# Patient Record
Sex: FEMALE | Race: WHITE | NOT HISPANIC OR LATINO | Employment: FULL TIME | URBAN - METROPOLITAN AREA
[De-identification: names, ages, dates, MRNs, and addresses within clinical notes are randomized per-mention and may not be internally consistent; named-entity substitution may affect disease eponyms.]

---

## 2020-08-21 ENCOUNTER — TRANSCRIBE ORDERS (OUTPATIENT)
Dept: ADMINISTRATIVE | Facility: HOSPITAL | Age: 55
End: 2020-08-21

## 2020-08-21 DIAGNOSIS — Z12.31 SCREENING MAMMOGRAM FOR HIGH-RISK PATIENT: Primary | ICD-10-CM

## 2020-08-26 ENCOUNTER — HOSPITAL ENCOUNTER (OUTPATIENT)
Dept: MAMMOGRAPHY | Facility: HOSPITAL | Age: 55
Discharge: HOME | End: 2020-08-26
Attending: SPECIALIST
Payer: COMMERCIAL

## 2020-08-26 VITALS — HEIGHT: 62 IN | WEIGHT: 132 LBS | BODY MASS INDEX: 24.29 KG/M2

## 2020-08-26 DIAGNOSIS — Z12.31 SCREENING MAMMOGRAM FOR HIGH-RISK PATIENT: ICD-10-CM

## 2020-08-26 PROCEDURE — 77067 SCR MAMMO BI INCL CAD: CPT

## 2020-08-26 PROCEDURE — 77063 BREAST TOMOSYNTHESIS BI: CPT

## 2020-10-15 ENCOUNTER — ANESTHESIA EVENT (OUTPATIENT)
Dept: GASTROENTEROLOGY | Facility: HOSPITAL | Age: 55
End: 2020-10-15

## 2020-10-15 PROBLEM — F32.A DEPRESSION: Status: ACTIVE | Noted: 2020-10-15

## 2020-10-15 PROBLEM — K21.9 GASTROESOPHAGEAL REFLUX DISEASE: Status: ACTIVE | Noted: 2020-10-15

## 2020-10-16 ENCOUNTER — HOSPITAL ENCOUNTER (OUTPATIENT)
Dept: GASTROENTEROLOGY | Facility: HOSPITAL | Age: 55
Setting detail: OUTPATIENT SURGERY
Discharge: HOME | End: 2020-10-16
Attending: COLON & RECTAL SURGERY | Admitting: COLON & RECTAL SURGERY
Payer: COMMERCIAL

## 2020-10-16 ENCOUNTER — ANESTHESIA (OUTPATIENT)
Dept: GASTROENTEROLOGY | Facility: HOSPITAL | Age: 55
End: 2020-10-16

## 2020-10-16 VITALS
SYSTOLIC BLOOD PRESSURE: 140 MMHG | DIASTOLIC BLOOD PRESSURE: 81 MMHG | RESPIRATION RATE: 16 BRPM | WEIGHT: 132 LBS | HEART RATE: 57 BPM | TEMPERATURE: 96.9 F | BODY MASS INDEX: 24.29 KG/M2 | HEIGHT: 62 IN | OXYGEN SATURATION: 100 %

## 2020-10-16 VITALS — HEART RATE: 56 BPM

## 2020-10-16 DIAGNOSIS — Z12.11 ENCOUNTER FOR SCREENING FOR MALIGNANT NEOPLASM OF COLON: ICD-10-CM

## 2020-10-16 DIAGNOSIS — Z86.010 PERSONAL HISTORY OF COLONIC POLYPS: ICD-10-CM

## 2020-10-16 PROBLEM — F17.200 SMOKING: Status: ACTIVE | Noted: 2020-10-16

## 2020-10-16 RX ORDER — SODIUM CHLORIDE 9 MG/ML
50 INJECTION, SOLUTION INTRAVENOUS CONTINUOUS
Status: DISCONTINUED | OUTPATIENT
Start: 2020-10-16 | End: 2020-10-20 | Stop reason: HOSPADM

## 2020-10-16 RX ORDER — PROPOFOL 10 MG/ML
INJECTION, EMULSION INTRAVENOUS AS NEEDED
Status: DISCONTINUED | OUTPATIENT
Start: 2020-10-16 | End: 2020-10-16

## 2020-10-16 RX ADMIN — SODIUM CHLORIDE: 0.9 INJECTION, SOLUTION INTRAVENOUS at 10:28

## 2020-10-16 RX ADMIN — PROPOFOL 50 MG: 10 INJECTION, EMULSION INTRAVENOUS at 11:06

## 2020-10-16 RX ADMIN — PROPOFOL 50 MG: 10 INJECTION, EMULSION INTRAVENOUS at 11:05

## 2020-10-16 RX ADMIN — PROPOFOL 50 MG: 10 INJECTION, EMULSION INTRAVENOUS at 11:11

## 2020-10-16 RX ADMIN — PROPOFOL 50 MG: 10 INJECTION, EMULSION INTRAVENOUS at 11:13

## 2020-10-16 RX ADMIN — PROPOFOL 50 MG: 10 INJECTION, EMULSION INTRAVENOUS at 11:09

## 2020-10-16 RX ADMIN — PROPOFOL 50 MG: 10 INJECTION, EMULSION INTRAVENOUS at 11:15

## 2021-04-13 ENCOUNTER — IMMUNIZATIONS (OUTPATIENT)
Dept: FAMILY MEDICINE CLINIC | Facility: HOSPITAL | Age: 56
End: 2021-04-13

## 2021-04-13 DIAGNOSIS — Z23 ENCOUNTER FOR IMMUNIZATION: Primary | ICD-10-CM

## 2021-04-13 PROCEDURE — 91301 SARS-COV-2 / COVID-19 MRNA VACCINE (MODERNA) 100 MCG: CPT

## 2021-04-13 PROCEDURE — 0011A SARS-COV-2 / COVID-19 MRNA VACCINE (MODERNA) 100 MCG: CPT

## 2021-05-13 ENCOUNTER — IMMUNIZATIONS (OUTPATIENT)
Dept: FAMILY MEDICINE CLINIC | Facility: HOSPITAL | Age: 56
End: 2021-05-13

## 2021-05-13 DIAGNOSIS — Z23 ENCOUNTER FOR IMMUNIZATION: Primary | ICD-10-CM

## 2021-05-13 PROCEDURE — 0012A SARS-COV-2 / COVID-19 MRNA VACCINE (MODERNA) 100 MCG: CPT

## 2021-05-13 PROCEDURE — 91301 SARS-COV-2 / COVID-19 MRNA VACCINE (MODERNA) 100 MCG: CPT

## 2022-01-09 ENCOUNTER — HOSPITAL ENCOUNTER (EMERGENCY)
Facility: HOSPITAL | Age: 57
Discharge: HOME | End: 2022-01-09
Attending: EMERGENCY MEDICINE
Payer: COMMERCIAL

## 2022-01-09 VITALS
DIASTOLIC BLOOD PRESSURE: 61 MMHG | RESPIRATION RATE: 16 BRPM | HEART RATE: 118 BPM | HEIGHT: 62 IN | TEMPERATURE: 97.5 F | SYSTOLIC BLOOD PRESSURE: 112 MMHG | OXYGEN SATURATION: 97 %
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
## 2022-01-09 DIAGNOSIS — F10.920 ALCOHOLIC INTOXICATION WITHOUT COMPLICATION (CMS/HCC): ICD-10-CM

## 2022-01-09 PROCEDURE — 99283 EMERGENCY DEPT VISIT LOW MDM: CPT | Mod: 25

## 2022-01-09 PROCEDURE — 63700000 HC SELF-ADMINISTRABLE DRUG: Performed by: NURSE PRACTITIONER

## 2022-01-09 RX ORDER — DOXEPIN HYDROCHLORIDE 50 MG/1
CAPSULE ORAL NIGHTLY PRN
COMMUNITY
Start: 2021-11-30

## 2022-01-09 RX ORDER — ONDANSETRON 4 MG/1
4 TABLET, ORALLY DISINTEGRATING ORAL ONCE
Status: COMPLETED | OUTPATIENT
Start: 2022-01-09 | End: 2022-01-09

## 2022-01-09 RX ADMIN — ONDANSETRON 4 MG: 4 TABLET, ORALLY DISINTEGRATING ORAL at 22:05

## 2022-01-09 ASSESSMENT — ENCOUNTER SYMPTOMS
VOMITING: 0
NAUSEA: 0
BRUISES/BLEEDS EASILY: 0
WEAKNESS: 0
WOUND: 0
HEADACHES: 0
BACK PAIN: 0
SHORTNESS OF BREATH: 0
ABDOMINAL PAIN: 0
NECK PAIN: 0

## 2022-01-10 NOTE — ED PROVIDER NOTES
Emergency Medicine Note  HPI   HISTORY OF PRESENT ILLNESS     56-year-old female with past medical history alcoholism presents to the emergency department from [facility] with reported fall.  Patient states that she was drinking all day today, she cannot quantify the amount of alcohol that she had and reports her last drink was approximately 1 hour ago.  She states that she was walking into [facility] to check her self and for alcohol rehabilitation when she slipped and fell.  She states that she fell outside the building.  Patient denies LOC or resulting injury.  She offers no complaints in the emergency department and states that she does not know why she is here.  She denies headache, neck pain, chest pain, difficulty breathing, abdominal pain, nausea or vomiting, injury to extremities.  Patient states that she is been ambulatory since the fall.  Denies anticoagulation use.            Patient History   PAST HISTORY     Reviewed from Nursing Triage:       No past medical history on file.    Past Surgical History:   Procedure Laterality Date   • ORTHOPEDIC SURGERY         No family history on file.    Social History     Tobacco Use   • Smoking status: Current Every Day Smoker   • Smokeless tobacco: Never Used   Substance Use Topics   • Alcohol use: Yes     Comment: ETOH   • Drug use: Never         Review of Systems   REVIEW OF SYSTEMS     Review of Systems   Respiratory: Negative for shortness of breath.    Cardiovascular: Negative for chest pain.   Gastrointestinal: Negative for abdominal pain, nausea and vomiting.   Musculoskeletal: Negative for back pain and neck pain.   Skin: Negative for wound.   Neurological: Negative for weakness and headaches.   Hematological: Does not bruise/bleed easily.         VITALS     ED Vitals    Date/Time Temp Pulse Resp BP SpO2 [facility]   01/09/22 2136 36.4 °C (97.5 °F) 118 16 112/61 97 % AG        Pulse Ox %: 97 % (01/09/22 2146)  Pulse Ox Interpretation: Normal (01/09/22 2146)           Physical  Exam   PHYSICAL EXAM     Physical Exam  Vitals and nursing note reviewed.   Constitutional:       Appearance: Normal appearance.      Comments: Patient appears intoxicated   HENT:      Head: Atraumatic.      Jaw: There is normal jaw occlusion. No trismus, tenderness, swelling, pain on movement or malocclusion.      Nose: Nose normal.      Mouth/Throat:      Dentition: Normal dentition.        Comments: Ecchymosis to lower lip; no dental injury, fracture, avulsion or subluxation of teeth  FROM of jaw without pain  Eyes:      Extraocular Movements: Extraocular movements intact.   Cardiovascular:      Rate and Rhythm: Normal rate and regular rhythm.   Pulmonary:      Effort: Pulmonary effort is normal. No respiratory distress.      Breath sounds: Normal breath sounds.   Abdominal:      General: Abdomen is flat. Bowel sounds are normal. There is no distension.      Palpations: Abdomen is soft.      Tenderness: There is no abdominal tenderness. There is no guarding.   Musculoskeletal:         General: No deformity or signs of injury. Normal range of motion.      Cervical back: Normal range of motion. No pain with movement, spinous process tenderness or muscular tenderness. Normal range of motion.      Thoracic back: No tenderness or bony tenderness. Normal range of motion.      Lumbar back: No tenderness or bony tenderness. Normal range of motion.      Comments: Moves all extremities equally  Pelvis stable   Skin:     General: Skin is warm and dry.   Neurological:      General: No focal deficit present.      Mental Status: She is alert and oriented to person, place, and time.   Psychiatric:         Mood and Affect: Mood normal.         Behavior: Behavior normal.           PROCEDURES     Procedures     DATA     Results     None          Imaging Results    None         No orders to display       Scoring tools                                 ED Course & MDM   MDM / ED COURSE and CLINICAL IMPRESSIONS     MDM  Number of  Diagnoses or Management Options     Amount and/or Complexity of Data Reviewed  Discuss the patient with other providers: yes    Patient Progress  Patient progress: stable      ED Course as of 01/09/22 2205   Sun Jan 09, 2022 2145 Impression: Patient was walking into [facility] when she slipped and fell; she denies injury and offers no complaints in the ED; states she does not know why she is here  Patient ambulatory in ED, no outward signs of trauma, moves all extremities equally  Patient intoxicated, states that she was drinking PTA; she was checking into [facility] for alcohol rehabilitation and would like to go back to [facility]  We spoke to [facility] who will arrange for transportation back to facility [EP]      ED Course User Index  [EP] Pallante, Elizabeth P, CRNP         Clinical Impressions as of 01/09/22 2205   Fall, initial encounter   Alcoholic intoxication without complication (CMS/[facility])            Pallante, Elizabeth P, CRNP  01/09/22 2154       Pallante, Elizabeth P, CRNP  01/09/22 2205

## 2022-01-10 NOTE — ED ATTESTATION NOTE
1/9/20229:44 PM  I have personally seen the patient.  I reviewed and agree with the PA/NP/Resident's assessment and plan of care.         Steffen Marie, DO  01/09/22 2149

## 2022-01-10 NOTE — DISCHARGE INSTRUCTIONS
You were seen in the emergency department after a fall.  On exam you have no resulting signs of injury or trauma from the fall and have been ambulating in the emergency department without difficulty.    Please go directly to [facility] to complete alcohol rehabilitation.    [facility] may give you Motrin 600 mg every 6 hours as needed for pain.    If you develop severe headache, severe chest pain or difficulty breathing, or any other new or concerning symptoms return to the emergency department.

## 2022-10-07 ENCOUNTER — HOSPITAL ENCOUNTER (EMERGENCY)
Facility: HOSPITAL | Age: 57
Discharge: HOME | End: 2022-10-07
Attending: EMERGENCY MEDICINE
Payer: COMMERCIAL

## 2022-10-07 VITALS
BODY MASS INDEX: 22.08 KG/M2 | RESPIRATION RATE: 16 BRPM | OXYGEN SATURATION: 98 % | TEMPERATURE: 97 F | DIASTOLIC BLOOD PRESSURE: 74 MMHG | HEART RATE: 99 BPM | HEIGHT: 62 IN | WEIGHT: 120 LBS | SYSTOLIC BLOOD PRESSURE: 124 MMHG

## 2022-10-07 DIAGNOSIS — R79.89 ELEVATED LFTS: ICD-10-CM

## 2022-10-07 DIAGNOSIS — F10.920 ALCOHOLIC INTOXICATION WITHOUT COMPLICATION (CMS/HCC): Primary | ICD-10-CM

## 2022-10-07 LAB
ALBUMIN SERPL-MCNC: 4.4 G/DL (ref 3.4–5)
ALP SERPL-CCNC: 75 IU/L (ref 35–126)
ALT SERPL-CCNC: 94 IU/L (ref 11–54)
AMPHET UR QL SCN: NOT DETECTED
ANION GAP SERPL CALC-SCNC: 17 MEQ/L (ref 3–15)
APAP SERPL-MCNC: <10 UG/ML (ref 10–30)
AST SERPL-CCNC: 156 IU/L (ref 15–41)
BARBITURATES UR QL SCN: NOT DETECTED
BASOPHILS # BLD: 0.06 K/UL (ref 0.01–0.1)
BASOPHILS NFR BLD: 0.8 %
BENZODIAZ UR QL SCN: NOT DETECTED
BILIRUB SERPL-MCNC: 1.1 MG/DL (ref 0.3–1.2)
BUN SERPL-MCNC: 10 MG/DL (ref 8–20)
CALCIUM SERPL-MCNC: 9.1 MG/DL (ref 8.9–10.3)
CANNABINOIDS UR QL SCN: NOT DETECTED
CHLORIDE SERPL-SCNC: 98 MEQ/L (ref 98–109)
CO2 SERPL-SCNC: 23 MEQ/L (ref 22–32)
COCAINE UR QL SCN: NOT DETECTED
CREAT SERPL-MCNC: 0.7 MG/DL (ref 0.6–1.1)
DIFFERENTIAL METHOD BLD: ABNORMAL
EOSINOPHIL # BLD: 0.02 K/UL (ref 0.04–0.36)
EOSINOPHIL NFR BLD: 0.3 %
ERYTHROCYTE [DISTWIDTH] IN BLOOD BY AUTOMATED COUNT: 13.5 % (ref 11.7–14.4)
ETHANOL SERPL-MCNC: 304 MG/DL
GFR SERPL CREATININE-BSD FRML MDRD: >60 ML/MIN/1.73M*2
GLUCOSE SERPL-MCNC: 102 MG/DL (ref 70–99)
HCT VFR BLDCO AUTO: 40.1 % (ref 35–45)
HGB BLD-MCNC: 13.9 G/DL (ref 11.8–15.7)
IMM GRANULOCYTES # BLD AUTO: 0.02 K/UL (ref 0–0.08)
IMM GRANULOCYTES NFR BLD AUTO: 0.3 %
LYMPHOCYTES # BLD: 1.11 K/UL (ref 1.2–3.5)
LYMPHOCYTES NFR BLD: 14.3 %
MCH RBC QN AUTO: 31.8 PG (ref 28–33.2)
MCHC RBC AUTO-ENTMCNC: 34.7 G/DL (ref 32.2–35.5)
MCV RBC AUTO: 91.8 FL (ref 83–98)
MONOCYTES # BLD: 0.39 K/UL (ref 0.28–0.8)
MONOCYTES NFR BLD: 5 %
NEUTROPHILS # BLD: 6.14 K/UL (ref 1.7–7)
NEUTS SEG NFR BLD: 79.3 %
NRBC BLD-RTO: 0 %
OPIATES UR QL SCN: NOT DETECTED
PCP UR QL SCN: NOT DETECTED
PDW BLD AUTO: 10.5 FL (ref 9.4–12.3)
PLATELET # BLD AUTO: 117 K/UL (ref 150–369)
POTASSIUM SERPL-SCNC: 3.5 MEQ/L (ref 3.6–5.1)
PROT SERPL-MCNC: 7.3 G/DL (ref 6–8.2)
RBC # BLD AUTO: 4.37 M/UL (ref 3.93–5.22)
SALICYLATES SERPL-MCNC: <4 MG/DL
SODIUM SERPL-SCNC: 138 MEQ/L (ref 136–144)
WBC # BLD AUTO: 7.74 K/UL (ref 3.8–10.5)

## 2022-10-07 PROCEDURE — G0480 DRUG TEST DEF 1-7 CLASSES: HCPCS

## 2022-10-07 PROCEDURE — 85025 COMPLETE CBC W/AUTO DIFF WBC: CPT

## 2022-10-07 PROCEDURE — 36415 COLL VENOUS BLD VENIPUNCTURE: CPT

## 2022-10-07 PROCEDURE — 80053 COMPREHEN METABOLIC PANEL: CPT | Performed by: EMERGENCY MEDICINE

## 2022-10-07 PROCEDURE — G0480 DRUG TEST DEF 1-7 CLASSES: HCPCS | Performed by: EMERGENCY MEDICINE

## 2022-10-07 PROCEDURE — 80307 DRUG TEST PRSMV CHEM ANLYZR: CPT | Performed by: PHYSICIAN ASSISTANT

## 2022-10-07 PROCEDURE — 85025 COMPLETE CBC W/AUTO DIFF WBC: CPT | Performed by: EMERGENCY MEDICINE

## 2022-10-07 PROCEDURE — 99283 EMERGENCY DEPT VISIT LOW MDM: CPT

## 2022-10-07 ASSESSMENT — ENCOUNTER SYMPTOMS
DIZZINESS: 0
WOUND: 0
DIARRHEA: 0
CONFUSION: 0
NERVOUS/ANXIOUS: 1
FEVER: 0
HEADACHES: 0
BACK PAIN: 0
NAUSEA: 1
VOMITING: 0
NUMBNESS: 0
SHORTNESS OF BREATH: 0
NECK PAIN: 0
ABDOMINAL PAIN: 0
WEAKNESS: 0

## 2022-10-07 NOTE — ED ATTESTATION NOTE
Procedures  Physical Exam  Review of Systems    10/7/84857:06 PM  I have personally seen and examined the patient.  I reviewed and agree with the PA/NP/Resident's assessment and plan of care.    My examination, assessment, and plan of care of Meghna Morales is as follows:  The patient presents requesting detox.  Patient had a fall yesterday.  Exam: Alert and oriented  GCS 15  No external trauma appreciated  Impression/Plan: Patient has an alcohol level over 300.  We will work on placement    Vital Signs Review: Vital signs have been reviewed. The oxygen saturation is  SpO2: 97 % which is normal.    I was physically present for the key/critical portions of the following procedures: None    This document was created using dragon dictation software.  There might be some typographical errors due to this technology.    We are in a pandemic with increased volumes and decreased capacity.      Axel Jay MD  10/07/22 9233

## 2022-10-07 NOTE — ED PROVIDER NOTES
Emergency Medicine Note  HPI   HISTORY OF PRESENT ILLNESS     HPI     58 yo female with h/o alcohol abuse, depression, dyslipidemia presents for medical clearance to start detox at [facility]. Pt states she drinks vodka daily. Last drink 2 days ago. Pt notes recent fall without injury. Denies head injury, headache, neck or back pain, chest pain, abdominal pain. Pt notes withdrawal symptom - anxiety, tremor, nausea. Pt has not been eating very much recently. Denies h/o detox/rehab.      Patient History   PAST HISTORY     Reviewed from Nursing Triage:         History reviewed. No pertinent past medical history.    Past Surgical History:   Procedure Laterality Date    ORTHOPEDIC SURGERY         History reviewed. No pertinent family history.    Social History     Tobacco Use    Smoking status: Current Every Day Smoker    Smokeless tobacco: Never Used   Substance Use Topics    Alcohol use: Yes     Comment: ETOH    Drug use: Never         Review of Systems   REVIEW OF SYSTEMS     Review of Systems   Constitutional: Negative for fever.   HENT: Negative for congestion.    Eyes: Negative for visual disturbance.   Respiratory: Negative for shortness of breath.    Cardiovascular: Negative for chest pain.   Gastrointestinal: Positive for nausea. Negative for abdominal pain, diarrhea and vomiting.   Musculoskeletal: Negative for back pain and neck pain.   Skin: Negative for wound.   Neurological: Negative for dizziness, weakness, numbness and headaches.   Psychiatric/Behavioral: Negative for confusion. The patient is nervous/anxious.          VITALS     ED Vitals    Date/Time Temp Pulse Resp BP SpO2 [facility]   10/07/22 1453 -- 99 16 124/74 98 % KP   10/07/22 1312 36.1 °C (97 °F) 103 20 158/79 97 % PB                       Physical Exam   PHYSICAL EXAM     Physical Exam  Vitals and nursing note reviewed.   Constitutional:       Appearance: She is well-developed.   HENT:      Head: Normocephalic and atraumatic.      Mouth/Throat:       Mouth: Mucous membranes are moist.   Eyes:      Extraocular Movements: Extraocular movements intact.   Cardiovascular:      Rate and Rhythm: Normal rate and regular rhythm.   Pulmonary:      Effort: Pulmonary effort is normal.      Breath sounds: Normal breath sounds.   Musculoskeletal:         General: Normal range of motion.      Cervical back: Neck supple.   Skin:     General: Skin is warm and dry.   Neurological:      General: No focal deficit present.      Mental Status: She is alert and oriented to person, place, and time.      Sensory: No sensory deficit.      Motor: No weakness.      Gait: Gait normal.   Psychiatric:         Mood and Affect: Mood normal.           PROCEDURES     Procedures     DATA     Results     Procedure Component Value Units Date/Time    Urine drug screen (UDS) [[id number]]  (Normal) Collected: 10/07/22 1431    Specimen: Urine, Clean Catch Updated: 10/07/22 1451     PCP Scrn, Ur Not Detected     Comment: Assay Detects: phencyclidine in urine. Lowest detectable concentration is 25 ng/mL of phencyclidine.        Benzodiazepine Ur Qual Not Detected     Comment: Assay Detects: benzodiazepines and metabolites at varying concentrations. Lowest detectable concentration is 200 ng/mL of oxazepam.        Cocaine Screen, Urine Not Detected     Comment: Assay Detects: benzoylecgonine and cocaine in urine. Lowest detectable concentration is 300 ng/mL of benzoylecgonine.        Amphetamine+Methamphetamine Screen, Ur Not Detected     Comment: Assay Detects: d-methamphetamine, d-amphetamine, methlyenedioxyamphetamine (MDA), and methlyenendioxymethamphetamine (MDMA) in urine. Lowest detectable concentration is 1000 ng/mL of d-methamphetamine.  Assay is less sensitive to MDA and MDMA (lowest detectable concentration, 2500 ng/mL) and could produce a false negative result. If MDMA overdose is suspected and the result is negative, a more specific test should be requested.        Cannabinoid Screen, Urine Not  Detected     Comment: Assay Detects: cannabinoid metabolites in urine. Lowest detectable concentration is 50 ng/mL        Opiate Scrn, Ur Not Detected     Comment: Assay Detects: codeine, dihydrocodeine, hydrocodone, hydromorphone, levorphanol, morphine, morphine-3-glucuronide, norcodeine, oxycodone in urine. Lowest detectable concentration is 300 ng/mL of morphine.        Barbiturate Screen, Ur Not Detected     Comment: Assay Detects: alphenal, amobarbital, aprobarbital, barbital, butabarbital, butalbital, butethal, diallybarbital, pentobarbital, secobarbital,talbutal, and thiopental. Lowest detectable concentration is 200 ng/mL of secobarbital.       ED Toxicology Screen - Alcohol, Acetaminophen, Salicylate [[id number]]  (Abnormal) Collected: 10/07/22 1315    Specimen: Blood, Venous Updated: 10/07/22 1400     Salicylate <4.0 mg/dL      Acetaminophen <10.0 ug/mL      Ethanol 304 mg/dL     Comprehensive metabolic panel [[id number]]  (Abnormal) Collected: 10/07/22 1315    Specimen: Blood, Venous Updated: 10/07/22 1400     Sodium 138 mEQ/L      Potassium 3.5 mEQ/L      Comment: Results obtained on plasma. Plasma Potassium values may be up to 0.4 mEQ/L less than serum values. The differences may be greater for patients with high platelet or white cell counts.        Chloride 98 mEQ/L      CO2 23 mEQ/L      BUN 10 mg/dL      Creatinine 0.7 mg/dL      Glucose 102 mg/dL      Calcium 9.1 mg/dL      AST (SGOT) 156 IU/L      ALT (SGPT) 94 IU/L      Alkaline Phosphatase 75 IU/L      Total Protein 7.3 g/dL      Comment: Test performed on plasma which typically contains approximately 0.4 g/dL more protein than serum.        Albumin 4.4 g/dL      Bilirubin, Total 1.1 mg/dL      eGFR >60.0 mL/min/1.73m*2      Anion Gap 17 mEQ/L     CBC and differential [[id number]]  (Abnormal) Collected: 10/07/22 1315    Specimen: Blood, Venous Updated: 10/07/22 1343     WBC 7.74 K/uL      RBC 4.37 M/uL      Hemoglobin 13.9 g/dL      Hematocrit 40.1  %      MCV 91.8 fL      MCH 31.8 pg      MCHC 34.7 g/dL      RDW 13.5 %      Platelets 117 K/uL      Comment: RESULTS CHECKED. SPECIMEN QUALITY CHECKED        MPV 10.5 fL      Differential Type Auto     nRBC 0.0 %      Immature Granulocytes 0.3 %      Neutrophils 79.3 %      Lymphocytes 14.3 %      Monocytes 5.0 %      Eosinophils 0.3 %      Basophils 0.8 %      Immature Granulocytes, Absolute 0.02 K/uL      Neutrophils, Absolute 6.14 K/uL      Lymphocytes, Absolute 1.11 K/uL      Monocytes, Absolute 0.39 K/uL      Eosinophils, Absolute 0.02 K/uL      Basophils, Absolute 0.06 K/uL     RAINBOW RED [[id number]] Collected: 10/07/22 1315    Specimen: Blood, Venous Updated: 10/07/22 1322    RAINBOW GOLD [[id number]] Collected: 10/07/22 1315    Specimen: Blood, Venous Updated: 10/07/22 1322    [place] Draw Panel [[id number]] Collected: 10/07/22 1315    Specimen: Blood, Venous Updated: 10/07/22 1322    Narrative:      The following orders were created for panel order [place] Draw Panel.  Procedure                               Abnormality         Status                     ---------                               -----------         ------                     RAINBOW RED[[id number]]                                      In process                 RAINBOW LT BLUE[[id number]]                                  In process                 RAINBOW GOLD[[id number]]                                     In process                   Please view results for these tests on the individual orders.    RAINBOW LT BLUE [[id number]] Collected: 10/07/22 1315    Specimen: Blood, Venous Updated: 10/07/22 1322          Imaging Results    None         No orders to display       Scoring tools                                  ED Course & MDM   MDM / ED COURSE / CLINICAL IMPRESSION / DISPO     MDM       Clinical Impression      Alcoholic intoxication without complication (CMS/HCC)   Elevated LFTs     Disposition           Verónica Linder PA C  10/07/22  1949

## 2022-10-07 NOTE — DISCHARGE INSTRUCTIONS
Follow-up with [facility] for detox and rehab.  Return to the emergency department if new or worsening symptoms develop.

## 2023-02-25 ENCOUNTER — APPOINTMENT (EMERGENCY)
Dept: RADIOLOGY | Facility: HOSPITAL | Age: 58
End: 2023-02-25

## 2023-02-25 ENCOUNTER — HOSPITAL ENCOUNTER (INPATIENT)
Facility: HOSPITAL | Age: 58
LOS: 1 days | Discharge: HOME | End: 2023-02-26
Attending: SURGERY | Admitting: SURGERY

## 2023-02-25 DIAGNOSIS — F10.920 ALCOHOLIC INTOXICATION WITHOUT COMPLICATION (HCC): Primary | ICD-10-CM

## 2023-02-25 DIAGNOSIS — S09.90XA CLOSED HEAD INJURY, INITIAL ENCOUNTER: ICD-10-CM

## 2023-02-25 DIAGNOSIS — F10.929 ACUTE ALCOHOL INTOXICATION (HCC): ICD-10-CM

## 2023-02-25 DIAGNOSIS — R41.0 DELIRIUM: ICD-10-CM

## 2023-02-25 DIAGNOSIS — J96.01 ACUTE HYPOXEMIC RESPIRATORY FAILURE (HCC): ICD-10-CM

## 2023-02-25 PROBLEM — S01.112A LACERATION OF LEFT EYEBROW: Status: ACTIVE | Noted: 2023-02-25

## 2023-02-25 PROBLEM — N26.1 KIDNEY ATROPHY: Status: ACTIVE | Noted: 2023-02-25

## 2023-02-25 PROBLEM — E87.20 LACTIC ACID ACIDOSIS: Status: ACTIVE | Noted: 2023-02-25
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
## 2023-02-25 LAB
ALBUMIN SERPL BCP-MCNC: 3.8 G/DL (ref 3.5–5)
ALBUMIN SERPL BCP-MCNC: 3.8 G/DL (ref 3.5–5)
ALP SERPL-CCNC: 88 U/L (ref 46–116)
ALP SERPL-CCNC: 88 U/L (ref 46–116)
ALT SERPL W P-5'-P-CCNC: 22 U/L (ref 12–78)
ALT SERPL W P-5'-P-CCNC: 22 U/L (ref 12–78)
ANION GAP SERPL CALCULATED.3IONS-SCNC: 9 MMOL/L (ref 4–13)
ANION GAP SERPL CALCULATED.3IONS-SCNC: 9 MMOL/L (ref 4–13)
AST SERPL W P-5'-P-CCNC: 26 U/L (ref 5–45)
AST SERPL W P-5'-P-CCNC: 26 U/L (ref 5–45)
BACTERIA UR QL AUTO: ABNORMAL /HPF
BACTERIA UR QL AUTO: ABNORMAL /HPF
BASE EXCESS BLDA CALC-SCNC: 1 MMOL/L (ref -2–3)
BASE EXCESS BLDA CALC-SCNC: 1 MMOL/L (ref -2–3)
BASOPHILS # BLD AUTO: 0.1 THOUSANDS/ÂΜL (ref 0–0.1)
BASOPHILS # BLD AUTO: 0.1 THOUSANDS/ÂΜL (ref 0–0.1)
BASOPHILS NFR BLD AUTO: 1 % (ref 0–1)
BASOPHILS NFR BLD AUTO: 1 % (ref 0–1)
BILIRUB SERPL-MCNC: 0.16 MG/DL (ref 0.2–1)
BILIRUB SERPL-MCNC: 0.16 MG/DL (ref 0.2–1)
BILIRUB UR QL STRIP: NEGATIVE
BILIRUB UR QL STRIP: NEGATIVE
BUN SERPL-MCNC: 12 MG/DL (ref 5–25)
BUN SERPL-MCNC: 12 MG/DL (ref 5–25)
CA-I BLD-SCNC: 1.08 MMOL/L (ref 1.12–1.32)
CA-I BLD-SCNC: 1.08 MMOL/L (ref 1.12–1.32)
CA-I BLD-SCNC: 1.14 MMOL/L (ref 1.12–1.32)
CA-I BLD-SCNC: 1.14 MMOL/L (ref 1.12–1.32)
CALCIUM SERPL-MCNC: 9 MG/DL (ref 8.3–10.1)
CALCIUM SERPL-MCNC: 9 MG/DL (ref 8.3–10.1)
CHLORIDE SERPL-SCNC: 109 MMOL/L (ref 96–108)
CHLORIDE SERPL-SCNC: 109 MMOL/L (ref 96–108)
CLARITY UR: CLEAR
CLARITY UR: CLEAR
CO2 SERPL-SCNC: 25 MMOL/L (ref 21–32)
CO2 SERPL-SCNC: 25 MMOL/L (ref 21–32)
COLOR UR: COLORLESS
COLOR UR: COLORLESS
CREAT SERPL-MCNC: 0.85 MG/DL (ref 0.6–1.3)
CREAT SERPL-MCNC: 0.85 MG/DL (ref 0.6–1.3)
EOSINOPHIL # BLD AUTO: 0.1 THOUSAND/ÂΜL (ref 0–0.61)
EOSINOPHIL # BLD AUTO: 0.1 THOUSAND/ÂΜL (ref 0–0.61)
EOSINOPHIL NFR BLD AUTO: 1 % (ref 0–6)
EOSINOPHIL NFR BLD AUTO: 1 % (ref 0–6)
ERYTHROCYTE [DISTWIDTH] IN BLOOD BY AUTOMATED COUNT: 13.9 % (ref 11.6–15.1)
ERYTHROCYTE [DISTWIDTH] IN BLOOD BY AUTOMATED COUNT: 13.9 % (ref 11.6–15.1)
GFR SERPL CREATININE-BSD FRML MDRD: 74 ML/MIN/1.73SQ M
GFR SERPL CREATININE-BSD FRML MDRD: 74 ML/MIN/1.73SQ M
GLUCOSE SERPL-MCNC: 110 MG/DL (ref 65–140)
GLUCOSE SERPL-MCNC: 110 MG/DL (ref 65–140)
GLUCOSE SERPL-MCNC: 114 MG/DL (ref 65–140)
GLUCOSE SERPL-MCNC: 114 MG/DL (ref 65–140)
GLUCOSE UR STRIP-MCNC: NEGATIVE MG/DL
GLUCOSE UR STRIP-MCNC: NEGATIVE MG/DL
HCO3 BLDA-SCNC: 27.4 MMOL/L (ref 24–30)
HCO3 BLDA-SCNC: 27.4 MMOL/L (ref 24–30)
HCT VFR BLD AUTO: 45.1 % (ref 34.8–46.1)
HCT VFR BLD AUTO: 45.1 % (ref 34.8–46.1)
HCT VFR BLD CALC: 46 % (ref 34.8–46.1)
HCT VFR BLD CALC: 46 % (ref 34.8–46.1)
HGB BLD-MCNC: 14.6 G/DL (ref 11.5–15.4)
HGB BLD-MCNC: 14.6 G/DL (ref 11.5–15.4)
HGB BLDA-MCNC: 15.6 G/DL (ref 11.5–15.4)
HGB BLDA-MCNC: 15.6 G/DL (ref 11.5–15.4)
HGB UR QL STRIP.AUTO: ABNORMAL
HGB UR QL STRIP.AUTO: ABNORMAL
HOLD SPECIMEN: NORMAL
IMM GRANULOCYTES # BLD AUTO: 0.04 THOUSAND/UL (ref 0–0.2)
IMM GRANULOCYTES # BLD AUTO: 0.04 THOUSAND/UL (ref 0–0.2)
IMM GRANULOCYTES NFR BLD AUTO: 0 % (ref 0–2)
IMM GRANULOCYTES NFR BLD AUTO: 0 % (ref 0–2)
KETONES UR STRIP-MCNC: NEGATIVE MG/DL
KETONES UR STRIP-MCNC: NEGATIVE MG/DL
LACTATE SERPL-SCNC: 2.9 MMOL/L (ref 0.5–2)
LACTATE SERPL-SCNC: 2.9 MMOL/L (ref 0.5–2)
LEUKOCYTE ESTERASE UR QL STRIP: NEGATIVE
LEUKOCYTE ESTERASE UR QL STRIP: NEGATIVE
LYMPHOCYTES # BLD AUTO: 4.43 THOUSANDS/ÂΜL (ref 0.6–4.47)
LYMPHOCYTES # BLD AUTO: 4.43 THOUSANDS/ÂΜL (ref 0.6–4.47)
LYMPHOCYTES NFR BLD AUTO: 44 % (ref 14–44)
LYMPHOCYTES NFR BLD AUTO: 44 % (ref 14–44)
MAGNESIUM SERPL-MCNC: 2.1 MG/DL (ref 1.6–2.6)
MAGNESIUM SERPL-MCNC: 2.1 MG/DL (ref 1.6–2.6)
MCH RBC QN AUTO: 30.3 PG (ref 26.8–34.3)
MCH RBC QN AUTO: 30.3 PG (ref 26.8–34.3)
MCHC RBC AUTO-ENTMCNC: 32.4 G/DL (ref 31.4–37.4)
MCHC RBC AUTO-ENTMCNC: 32.4 G/DL (ref 31.4–37.4)
MCV RBC AUTO: 94 FL (ref 82–98)
MCV RBC AUTO: 94 FL (ref 82–98)
MONOCYTES # BLD AUTO: 0.52 THOUSAND/ÂΜL (ref 0.17–1.22)
MONOCYTES # BLD AUTO: 0.52 THOUSAND/ÂΜL (ref 0.17–1.22)
MONOCYTES NFR BLD AUTO: 5 % (ref 4–12)
MONOCYTES NFR BLD AUTO: 5 % (ref 4–12)
NEUTROPHILS # BLD AUTO: 4.95 THOUSANDS/ÂΜL (ref 1.85–7.62)
NEUTROPHILS # BLD AUTO: 4.95 THOUSANDS/ÂΜL (ref 1.85–7.62)
NEUTS SEG NFR BLD AUTO: 49 % (ref 43–75)
NEUTS SEG NFR BLD AUTO: 49 % (ref 43–75)
NITRITE UR QL STRIP: NEGATIVE
NITRITE UR QL STRIP: NEGATIVE
NON-SQ EPI CELLS URNS QL MICRO: ABNORMAL /HPF
NON-SQ EPI CELLS URNS QL MICRO: ABNORMAL /HPF
NRBC BLD AUTO-RTO: 0 /100 WBCS
NRBC BLD AUTO-RTO: 0 /100 WBCS
PCO2 BLD: 29 MMOL/L (ref 21–32)
PCO2 BLD: 29 MMOL/L (ref 21–32)
PCO2 BLD: 48.6 MM HG (ref 42–50)
PCO2 BLD: 48.6 MM HG (ref 42–50)
PH BLD: 7.36 [PH] (ref 7.3–7.4)
PH BLD: 7.36 [PH] (ref 7.3–7.4)
PH UR STRIP.AUTO: 5.5 [PH]
PH UR STRIP.AUTO: 5.5 [PH]
PHOSPHATE SERPL-MCNC: 4 MG/DL (ref 2.3–4.1)
PHOSPHATE SERPL-MCNC: 4 MG/DL (ref 2.3–4.1)
PLATELET # BLD AUTO: 330 THOUSANDS/UL (ref 149–390)
PLATELET # BLD AUTO: 330 THOUSANDS/UL (ref 149–390)
PMV BLD AUTO: 10 FL (ref 8.9–12.7)
PMV BLD AUTO: 10 FL (ref 8.9–12.7)
PO2 BLD: 99 MM HG (ref 35–45)
PO2 BLD: 99 MM HG (ref 35–45)
POTASSIUM BLD-SCNC: 4.2 MMOL/L (ref 3.5–5.3)
POTASSIUM BLD-SCNC: 4.2 MMOL/L (ref 3.5–5.3)
POTASSIUM SERPL-SCNC: 4.1 MMOL/L (ref 3.5–5.3)
POTASSIUM SERPL-SCNC: 4.1 MMOL/L (ref 3.5–5.3)
PROT SERPL-MCNC: 7.1 G/DL (ref 6.4–8.4)
PROT SERPL-MCNC: 7.1 G/DL (ref 6.4–8.4)
PROT UR STRIP-MCNC: ABNORMAL MG/DL
PROT UR STRIP-MCNC: ABNORMAL MG/DL
RBC # BLD AUTO: 4.82 MILLION/UL (ref 3.81–5.12)
RBC # BLD AUTO: 4.82 MILLION/UL (ref 3.81–5.12)
RBC #/AREA URNS AUTO: ABNORMAL /HPF
RBC #/AREA URNS AUTO: ABNORMAL /HPF
SAO2 % BLD FROM PO2: 97 % (ref 60–85)
SAO2 % BLD FROM PO2: 97 % (ref 60–85)
SODIUM BLD-SCNC: 144 MMOL/L (ref 136–145)
SODIUM BLD-SCNC: 144 MMOL/L (ref 136–145)
SODIUM SERPL-SCNC: 143 MMOL/L (ref 135–147)
SODIUM SERPL-SCNC: 143 MMOL/L (ref 135–147)
SP GR UR STRIP.AUTO: 1.05 (ref 1–1.03)
SP GR UR STRIP.AUTO: 1.05 (ref 1–1.03)
SPECIMEN SOURCE: ABNORMAL
SPECIMEN SOURCE: ABNORMAL
UROBILINOGEN UR STRIP-ACNC: <2 MG/DL
UROBILINOGEN UR STRIP-ACNC: <2 MG/DL
WBC # BLD AUTO: 10.14 THOUSAND/UL (ref 4.31–10.16)
WBC # BLD AUTO: 10.14 THOUSAND/UL (ref 4.31–10.16)
WBC #/AREA URNS AUTO: ABNORMAL /HPF
WBC #/AREA URNS AUTO: ABNORMAL /HPF

## 2023-02-25 PROCEDURE — 0HQ1XZZ REPAIR FACE SKIN, EXTERNAL APPROACH: ICD-10-PCS | Performed by: SURGERY

## 2023-02-25 PROCEDURE — 5A1935Z RESPIRATORY VENTILATION, LESS THAN 24 CONSECUTIVE HOURS: ICD-10-PCS | Performed by: SURGERY

## 2023-02-25 PROCEDURE — 0BH17EZ INSERTION OF ENDOTRACHEAL AIRWAY INTO TRACHEA, VIA NATURAL OR ARTIFICIAL OPENING: ICD-10-PCS | Performed by: SURGERY

## 2023-02-25 RX ORDER — CHLORHEXIDINE GLUCONATE 0.12 MG/ML
15 RINSE ORAL EVERY 12 HOURS SCHEDULED
Status: DISCONTINUED | OUTPATIENT
Start: 2023-02-25 | End: 2023-02-26 | Stop reason: HOSPADM

## 2023-02-25 RX ORDER — PANTOPRAZOLE SODIUM 40 MG/10ML
40 INJECTION, POWDER, LYOPHILIZED, FOR SOLUTION INTRAVENOUS
Status: DISCONTINUED | OUTPATIENT
Start: 2023-02-25 | End: 2023-02-26

## 2023-02-25 RX ORDER — LIDOCAINE HYDROCHLORIDE 10 MG/ML
20 INJECTION, SOLUTION EPIDURAL; INFILTRATION; INTRACAUDAL; PERINEURAL ONCE
Status: DISCONTINUED | OUTPATIENT
Start: 2023-02-25 | End: 2023-02-26 | Stop reason: HOSPADM

## 2023-02-25 RX ORDER — FENTANYL CITRATE-0.9 % NACL/PF 10 MCG/ML
25 PLASTIC BAG, INJECTION (ML) INTRAVENOUS CONTINUOUS
Status: DISCONTINUED | OUTPATIENT
Start: 2023-02-25 | End: 2023-02-26

## 2023-02-25 RX ORDER — DEXMEDETOMIDINE HYDROCHLORIDE 4 UG/ML
.1-1.5 INJECTION, SOLUTION INTRAVENOUS
Status: DISCONTINUED | OUTPATIENT
Start: 2023-02-25 | End: 2023-02-26 | Stop reason: HOSPADM

## 2023-02-25 RX ORDER — OXYCODONE HYDROCHLORIDE 10 MG/1
10 TABLET ORAL EVERY 4 HOURS PRN
Status: DISCONTINUED | OUTPATIENT
Start: 2023-02-25 | End: 2023-02-25

## 2023-02-25 RX ORDER — HYDROMORPHONE HCL/PF 1 MG/ML
0.5 SYRINGE (ML) INJECTION EVERY 2 HOUR PRN
Status: DISCONTINUED | OUTPATIENT
Start: 2023-02-25 | End: 2023-02-25

## 2023-02-25 RX ORDER — ENOXAPARIN SODIUM 100 MG/ML
30 INJECTION SUBCUTANEOUS EVERY 12 HOURS SCHEDULED
Status: DISCONTINUED | OUTPATIENT
Start: 2023-02-25 | End: 2023-02-26 | Stop reason: HOSPADM

## 2023-02-25 RX ORDER — NOREPINEPHRINE BITARTRATE 1 MG/ML
INJECTION, SOLUTION INTRAVENOUS
Status: DISPENSED
Start: 2023-02-25 | End: 2023-02-26

## 2023-02-25 RX ORDER — KETAMINE HYDROCHLORIDE 50 MG/ML
INJECTION, SOLUTION, CONCENTRATE INTRAMUSCULAR; INTRAVENOUS CODE/TRAUMA/SEDATION MEDICATION
Status: COMPLETED | OUTPATIENT
Start: 2023-02-25 | End: 2023-02-25

## 2023-02-25 RX ORDER — SODIUM CHLORIDE, SODIUM GLUCONATE, SODIUM ACETATE, POTASSIUM CHLORIDE, MAGNESIUM CHLORIDE, SODIUM PHOSPHATE, DIBASIC, AND POTASSIUM PHOSPHATE .53; .5; .37; .037; .03; .012; .00082 G/100ML; G/100ML; G/100ML; G/100ML; G/100ML; G/100ML; G/100ML
100 INJECTION, SOLUTION INTRAVENOUS CONTINUOUS
Status: DISCONTINUED | OUTPATIENT
Start: 2023-02-25 | End: 2023-02-26 | Stop reason: HOSPADM

## 2023-02-25 RX ORDER — PROPOFOL 10 MG/ML
100 INJECTION, EMULSION INTRAVENOUS ONCE
Status: COMPLETED | OUTPATIENT
Start: 2023-02-25 | End: 2023-02-25

## 2023-02-25 RX ORDER — FENTANYL CITRATE 50 UG/ML
50 INJECTION, SOLUTION INTRAMUSCULAR; INTRAVENOUS EVERY 2 HOUR PRN
Status: DISCONTINUED | OUTPATIENT
Start: 2023-02-25 | End: 2023-02-26

## 2023-02-25 RX ORDER — PROPOFOL 10 MG/ML
5-50 INJECTION, EMULSION INTRAVENOUS
Status: DISCONTINUED | OUTPATIENT
Start: 2023-02-25 | End: 2023-02-26

## 2023-02-25 RX ORDER — OXYCODONE HYDROCHLORIDE 5 MG/1
5 TABLET ORAL EVERY 4 HOURS PRN
Status: DISCONTINUED | OUTPATIENT
Start: 2023-02-25 | End: 2023-02-25

## 2023-02-25 RX ORDER — GINSENG 100 MG
1 CAPSULE ORAL ONCE
Status: COMPLETED | OUTPATIENT
Start: 2023-02-25 | End: 2023-02-25

## 2023-02-25 RX ORDER — CHLORHEXIDINE GLUCONATE 0.12 MG/ML
15 RINSE ORAL EVERY 12 HOURS SCHEDULED
Status: DISCONTINUED | OUTPATIENT
Start: 2023-02-25 | End: 2023-02-25 | Stop reason: SDUPTHER

## 2023-02-25 RX ORDER — ACETAMINOPHEN 325 MG/1
650 TABLET ORAL EVERY 4 HOURS PRN
Status: DISCONTINUED | OUTPATIENT
Start: 2023-02-25 | End: 2023-02-25

## 2023-02-25 RX ORDER — SODIUM CHLORIDE, SODIUM GLUCONATE, SODIUM ACETATE, POTASSIUM CHLORIDE, MAGNESIUM CHLORIDE, SODIUM PHOSPHATE, DIBASIC, AND POTASSIUM PHOSPHATE .53; .5; .37; .037; .03; .012; .00082 G/100ML; G/100ML; G/100ML; G/100ML; G/100ML; G/100ML; G/100ML
1000 INJECTION, SOLUTION INTRAVENOUS ONCE
Status: COMPLETED | OUTPATIENT
Start: 2023-02-25 | End: 2023-02-26

## 2023-02-25 RX ORDER — PHENOBARBITAL SODIUM 65 MG/ML
130 INJECTION INTRAMUSCULAR ONCE
Status: COMPLETED | OUTPATIENT
Start: 2023-02-26 | End: 2023-02-26

## 2023-02-25 RX ORDER — KETAMINE HYDROCHLORIDE 50 MG/ML
4 INJECTION, SOLUTION, CONCENTRATE INTRAMUSCULAR; INTRAVENOUS ONCE
Status: CANCELLED | OUTPATIENT
Start: 2023-02-25 | End: 2023-02-25

## 2023-02-25 RX ADMIN — IOHEXOL 100 ML: 350 INJECTION, SOLUTION INTRAVENOUS at 20:46

## 2023-02-25 RX ADMIN — KETAMINE HYDROCHLORIDE 200 MG: 50 INJECTION, SOLUTION INTRAMUSCULAR; INTRAVENOUS at 20:28

## 2023-02-25 RX ADMIN — BACITRACIN 1 SMALL APPLICATION: 500 OINTMENT TOPICAL at 23:41

## 2023-02-25 RX ADMIN — CHLORHEXIDINE GLUCONATE 15 ML: 1.2 SOLUTION ORAL at 23:45

## 2023-02-25 RX ADMIN — PROPOFOL 100 MG: 10 INJECTION, EMULSION INTRAVENOUS at 21:34

## 2023-02-25 RX ADMIN — Medication 25 MCG/HR: at 21:30

## 2023-02-25 RX ADMIN — PROPOFOL 20 MCG/KG/MIN: 10 INJECTION, EMULSION INTRAVENOUS at 21:30

## 2023-02-25 RX ADMIN — SODIUM CHLORIDE, SODIUM GLUCONATE, SODIUM ACETATE, POTASSIUM CHLORIDE, MAGNESIUM CHLORIDE, SODIUM PHOSPHATE, DIBASIC, AND POTASSIUM PHOSPHATE 1000 ML: .53; .5; .37; .037; .03; .012; .00082 INJECTION, SOLUTION INTRAVENOUS at 23:48

## 2023-02-25 RX ADMIN — Medication 0.5 MCG/KG/HR: at 22:11

## 2023-02-25 RX ADMIN — ENOXAPARIN SODIUM 30 MG: 30 INJECTION SUBCUTANEOUS at 23:57

## 2023-02-25 RX ADMIN — SODIUM CHLORIDE, SODIUM GLUCONATE, SODIUM ACETATE, POTASSIUM CHLORIDE, MAGNESIUM CHLORIDE, SODIUM PHOSPHATE, DIBASIC, AND POTASSIUM PHOSPHATE 100 ML/HR: .53; .5; .37; .037; .03; .012; .00082 INJECTION, SOLUTION INTRAVENOUS at 23:48

## 2023-02-25 RX ADMIN — PANTOPRAZOLE SODIUM 40 MG: 40 INJECTION, POWDER, FOR SOLUTION INTRAVENOUS at 23:45

## 2023-02-26 VITALS
SYSTOLIC BLOOD PRESSURE: 119 MMHG | DIASTOLIC BLOOD PRESSURE: 70 MMHG | TEMPERATURE: 98.2 F | WEIGHT: 150.35 LBS | TEMPERATURE: 98.2 F | HEART RATE: 96 BPM | HEART RATE: 96 BPM | OXYGEN SATURATION: 99 % | OXYGEN SATURATION: 99 % | SYSTOLIC BLOOD PRESSURE: 119 MMHG | DIASTOLIC BLOOD PRESSURE: 70 MMHG | WEIGHT: 150.35 LBS | RESPIRATION RATE: 18 BRPM | RESPIRATION RATE: 18 BRPM

## 2023-02-26 LAB
ALBUMIN SERPL BCP-MCNC: 2.9 G/DL (ref 3.5–5)
ALBUMIN SERPL BCP-MCNC: 2.9 G/DL (ref 3.5–5)
ALP SERPL-CCNC: 68 U/L (ref 46–116)
ALP SERPL-CCNC: 68 U/L (ref 46–116)
ALT SERPL W P-5'-P-CCNC: 18 U/L (ref 12–78)
ALT SERPL W P-5'-P-CCNC: 18 U/L (ref 12–78)
AMPHETAMINES SERPL QL SCN: NEGATIVE
AMPHETAMINES SERPL QL SCN: NEGATIVE
ANION GAP SERPL CALCULATED.3IONS-SCNC: 9 MMOL/L (ref 4–13)
ANION GAP SERPL CALCULATED.3IONS-SCNC: 9 MMOL/L (ref 4–13)
AST SERPL W P-5'-P-CCNC: 36 U/L (ref 5–45)
AST SERPL W P-5'-P-CCNC: 36 U/L (ref 5–45)
BACTERIA UR QL AUTO: ABNORMAL /HPF
BACTERIA UR QL AUTO: ABNORMAL /HPF
BARBITURATES UR QL: NEGATIVE
BARBITURATES UR QL: NEGATIVE
BASOPHILS # BLD AUTO: 0.06 THOUSANDS/ÂΜL (ref 0–0.1)
BASOPHILS # BLD AUTO: 0.06 THOUSANDS/ÂΜL (ref 0–0.1)
BASOPHILS NFR BLD AUTO: 1 % (ref 0–1)
BASOPHILS NFR BLD AUTO: 1 % (ref 0–1)
BENZODIAZ UR QL: NEGATIVE
BENZODIAZ UR QL: NEGATIVE
BILIRUB SERPL-MCNC: 0.36 MG/DL (ref 0.2–1)
BILIRUB SERPL-MCNC: 0.36 MG/DL (ref 0.2–1)
BILIRUB UR QL STRIP: NEGATIVE
BILIRUB UR QL STRIP: NEGATIVE
BUN SERPL-MCNC: 9 MG/DL (ref 5–25)
BUN SERPL-MCNC: 9 MG/DL (ref 5–25)
CALCIUM ALBUM COR SERPL-MCNC: 8.6 MG/DL (ref 8.3–10.1)
CALCIUM ALBUM COR SERPL-MCNC: 8.6 MG/DL (ref 8.3–10.1)
CALCIUM SERPL-MCNC: 7.7 MG/DL (ref 8.3–10.1)
CALCIUM SERPL-MCNC: 7.7 MG/DL (ref 8.3–10.1)
CHLORIDE SERPL-SCNC: 110 MMOL/L (ref 96–108)
CHLORIDE SERPL-SCNC: 110 MMOL/L (ref 96–108)
CLARITY UR: CLEAR
CLARITY UR: CLEAR
CO2 SERPL-SCNC: 23 MMOL/L (ref 21–32)
CO2 SERPL-SCNC: 23 MMOL/L (ref 21–32)
COCAINE UR QL: NEGATIVE
COCAINE UR QL: NEGATIVE
COLOR UR: ABNORMAL
COLOR UR: ABNORMAL
CREAT SERPL-MCNC: 0.76 MG/DL (ref 0.6–1.3)
CREAT SERPL-MCNC: 0.76 MG/DL (ref 0.6–1.3)
EOSINOPHIL # BLD AUTO: 0.1 THOUSAND/ÂΜL (ref 0–0.61)
EOSINOPHIL # BLD AUTO: 0.1 THOUSAND/ÂΜL (ref 0–0.61)
EOSINOPHIL NFR BLD AUTO: 1 % (ref 0–6)
EOSINOPHIL NFR BLD AUTO: 1 % (ref 0–6)
ERYTHROCYTE [DISTWIDTH] IN BLOOD BY AUTOMATED COUNT: 13.9 % (ref 11.6–15.1)
ERYTHROCYTE [DISTWIDTH] IN BLOOD BY AUTOMATED COUNT: 13.9 % (ref 11.6–15.1)
GFR SERPL CREATININE-BSD FRML MDRD: 85 ML/MIN/1.73SQ M
GFR SERPL CREATININE-BSD FRML MDRD: 85 ML/MIN/1.73SQ M
GLUCOSE SERPL-MCNC: 132 MG/DL (ref 65–140)
GLUCOSE SERPL-MCNC: 132 MG/DL (ref 65–140)
GLUCOSE UR STRIP-MCNC: NEGATIVE MG/DL
GLUCOSE UR STRIP-MCNC: NEGATIVE MG/DL
HCT VFR BLD AUTO: 39 % (ref 34.8–46.1)
HCT VFR BLD AUTO: 39 % (ref 34.8–46.1)
HGB BLD-MCNC: 12.9 G/DL (ref 11.5–15.4)
HGB BLD-MCNC: 12.9 G/DL (ref 11.5–15.4)
HGB UR QL STRIP.AUTO: ABNORMAL
HGB UR QL STRIP.AUTO: ABNORMAL
IMM GRANULOCYTES # BLD AUTO: 0.02 THOUSAND/UL (ref 0–0.2)
IMM GRANULOCYTES # BLD AUTO: 0.02 THOUSAND/UL (ref 0–0.2)
IMM GRANULOCYTES NFR BLD AUTO: 0 % (ref 0–2)
IMM GRANULOCYTES NFR BLD AUTO: 0 % (ref 0–2)
KETONES UR STRIP-MCNC: ABNORMAL MG/DL
KETONES UR STRIP-MCNC: ABNORMAL MG/DL
LEUKOCYTE ESTERASE UR QL STRIP: ABNORMAL
LEUKOCYTE ESTERASE UR QL STRIP: ABNORMAL
LYMPHOCYTES # BLD AUTO: 3.54 THOUSANDS/ÂΜL (ref 0.6–4.47)
LYMPHOCYTES # BLD AUTO: 3.54 THOUSANDS/ÂΜL (ref 0.6–4.47)
LYMPHOCYTES NFR BLD AUTO: 40 % (ref 14–44)
LYMPHOCYTES NFR BLD AUTO: 40 % (ref 14–44)
MAGNESIUM SERPL-MCNC: 2.2 MG/DL (ref 1.6–2.6)
MAGNESIUM SERPL-MCNC: 2.2 MG/DL (ref 1.6–2.6)
MCH RBC QN AUTO: 30.4 PG (ref 26.8–34.3)
MCH RBC QN AUTO: 30.4 PG (ref 26.8–34.3)
MCHC RBC AUTO-ENTMCNC: 33.1 G/DL (ref 31.4–37.4)
MCHC RBC AUTO-ENTMCNC: 33.1 G/DL (ref 31.4–37.4)
MCV RBC AUTO: 92 FL (ref 82–98)
MCV RBC AUTO: 92 FL (ref 82–98)
METHADONE UR QL: NEGATIVE
METHADONE UR QL: NEGATIVE
MONOCYTES # BLD AUTO: 0.35 THOUSAND/ÂΜL (ref 0.17–1.22)
MONOCYTES # BLD AUTO: 0.35 THOUSAND/ÂΜL (ref 0.17–1.22)
MONOCYTES NFR BLD AUTO: 4 % (ref 4–12)
MONOCYTES NFR BLD AUTO: 4 % (ref 4–12)
NEUTROPHILS # BLD AUTO: 4.78 THOUSANDS/ÂΜL (ref 1.85–7.62)
NEUTROPHILS # BLD AUTO: 4.78 THOUSANDS/ÂΜL (ref 1.85–7.62)
NEUTS SEG NFR BLD AUTO: 54 % (ref 43–75)
NEUTS SEG NFR BLD AUTO: 54 % (ref 43–75)
NITRITE UR QL STRIP: NEGATIVE
NITRITE UR QL STRIP: NEGATIVE
NON-SQ EPI CELLS URNS QL MICRO: ABNORMAL /HPF
NON-SQ EPI CELLS URNS QL MICRO: ABNORMAL /HPF
NRBC BLD AUTO-RTO: 0 /100 WBCS
NRBC BLD AUTO-RTO: 0 /100 WBCS
OPIATES UR QL SCN: NEGATIVE
OPIATES UR QL SCN: NEGATIVE
OXYCODONE+OXYMORPHONE UR QL SCN: NEGATIVE
OXYCODONE+OXYMORPHONE UR QL SCN: NEGATIVE
PCP UR QL: NEGATIVE
PCP UR QL: NEGATIVE
PH UR STRIP.AUTO: 6.5 [PH]
PH UR STRIP.AUTO: 6.5 [PH]
PHOSPHATE SERPL-MCNC: 2.5 MG/DL (ref 2.3–4.1)
PHOSPHATE SERPL-MCNC: 2.5 MG/DL (ref 2.3–4.1)
PLATELET # BLD AUTO: 259 THOUSANDS/UL (ref 149–390)
PLATELET # BLD AUTO: 259 THOUSANDS/UL (ref 149–390)
PMV BLD AUTO: 10.1 FL (ref 8.9–12.7)
PMV BLD AUTO: 10.1 FL (ref 8.9–12.7)
POTASSIUM SERPL-SCNC: 3.8 MMOL/L (ref 3.5–5.3)
POTASSIUM SERPL-SCNC: 3.8 MMOL/L (ref 3.5–5.3)
PROT SERPL-MCNC: 5.9 G/DL (ref 6.4–8.4)
PROT SERPL-MCNC: 5.9 G/DL (ref 6.4–8.4)
PROT UR STRIP-MCNC: ABNORMAL MG/DL
PROT UR STRIP-MCNC: ABNORMAL MG/DL
RBC # BLD AUTO: 4.25 MILLION/UL (ref 3.81–5.12)
RBC # BLD AUTO: 4.25 MILLION/UL (ref 3.81–5.12)
RBC #/AREA URNS AUTO: ABNORMAL /HPF
RBC #/AREA URNS AUTO: ABNORMAL /HPF
SODIUM SERPL-SCNC: 142 MMOL/L (ref 135–147)
SODIUM SERPL-SCNC: 142 MMOL/L (ref 135–147)
SP GR UR STRIP.AUTO: 1.05 (ref 1–1.03)
SP GR UR STRIP.AUTO: 1.05 (ref 1–1.03)
THC UR QL: NEGATIVE
THC UR QL: NEGATIVE
UROBILINOGEN UR STRIP-ACNC: <2 MG/DL
UROBILINOGEN UR STRIP-ACNC: <2 MG/DL
WBC # BLD AUTO: 8.85 THOUSAND/UL (ref 4.31–10.16)
WBC # BLD AUTO: 8.85 THOUSAND/UL (ref 4.31–10.16)
WBC #/AREA URNS AUTO: ABNORMAL /HPF
WBC #/AREA URNS AUTO: ABNORMAL /HPF

## 2023-02-26 RX ORDER — FENTANYL CITRATE 50 UG/ML
50 INJECTION, SOLUTION INTRAMUSCULAR; INTRAVENOUS ONCE
Status: COMPLETED | OUTPATIENT
Start: 2023-02-26 | End: 2023-02-26

## 2023-02-26 RX ORDER — SODIUM CHLORIDE, SODIUM GLUCONATE, SODIUM ACETATE, POTASSIUM CHLORIDE, MAGNESIUM CHLORIDE, SODIUM PHOSPHATE, DIBASIC, AND POTASSIUM PHOSPHATE .53; .5; .37; .037; .03; .012; .00082 G/100ML; G/100ML; G/100ML; G/100ML; G/100ML; G/100ML; G/100ML
1000 INJECTION, SOLUTION INTRAVENOUS ONCE
Status: COMPLETED | OUTPATIENT
Start: 2023-02-26 | End: 2023-02-26

## 2023-02-26 RX ADMIN — SODIUM CHLORIDE, SODIUM GLUCONATE, SODIUM ACETATE, POTASSIUM CHLORIDE, MAGNESIUM CHLORIDE, SODIUM PHOSPHATE, DIBASIC, AND POTASSIUM PHOSPHATE 1000 ML: .53; .5; .37; .037; .03; .012; .00082 INJECTION, SOLUTION INTRAVENOUS at 02:11

## 2023-02-26 RX ADMIN — NOREPINEPHRINE BITARTRATE 2 MCG/MIN: 1 SOLUTION INTRAVENOUS at 03:20

## 2023-02-26 RX ADMIN — CHLORHEXIDINE GLUCONATE 15 ML: 1.2 SOLUTION ORAL at 08:19

## 2023-02-26 RX ADMIN — THIAMINE HYDROCHLORIDE: 100 INJECTION, SOLUTION INTRAMUSCULAR; INTRAVENOUS at 08:06

## 2023-02-26 RX ADMIN — FENTANYL CITRATE 50 MCG: 50 INJECTION INTRAMUSCULAR; INTRAVENOUS at 03:47

## 2023-02-26 RX ADMIN — ENOXAPARIN SODIUM 30 MG: 30 INJECTION SUBCUTANEOUS at 08:05

## 2023-02-26 RX ADMIN — PHENOBARBITAL SODIUM 130 MG: 65 INJECTION INTRAMUSCULAR; INTRAVENOUS at 00:03

## 2023-02-26 RX ADMIN — Medication 1.5 MCG/KG/HR: at 03:58

## 2023-02-26 RX ADMIN — PANTOPRAZOLE SODIUM 40 MG: 40 INJECTION, POWDER, FOR SOLUTION INTRAVENOUS at 08:05

## 2023-02-26 RX ADMIN — FENTANYL CITRATE 50 MCG: 50 INJECTION INTRAMUSCULAR; INTRAVENOUS at 04:03

## 2023-02-26 RX ADMIN — SODIUM CHLORIDE, SODIUM GLUCONATE, SODIUM ACETATE, POTASSIUM CHLORIDE, MAGNESIUM CHLORIDE, SODIUM PHOSPHATE, DIBASIC, AND POTASSIUM PHOSPHATE 100 ML/HR: .53; .5; .37; .037; .03; .012; .00082 INJECTION, SOLUTION INTRAVENOUS at 09:55

## 2023-02-26 RX ADMIN — TETANUS TOXOID, REDUCED DIPHTHERIA TOXOID AND ACELLULAR PERTUSSIS VACCINE, ADSORBED 0.5 ML: 5; 2.5; 8; 8; 2.5 SUSPENSION INTRAMUSCULAR at 00:17

## 2023-02-26 RX ADMIN — POTASSIUM PHOSPHATE, MONOBASIC AND POTASSIUM PHOSPHATE, DIBASIC 30 MMOL: 224; 236 INJECTION, SOLUTION, CONCENTRATE INTRAVENOUS at 08:05

## 2023-02-26 NOTE — PROCEDURES
Laceration repair    Date/Time: 2/25/2023 10:52 PM  Performed by: Dixie Carvajal MD  Authorized by: Dixie Carvajal MD   Consent: The procedure was performed in an emergent situation  Radiology Images displayed and confirmed  If images not available, report reviewed: imaging studies available  Patient identity confirmed: arm band  Time out: Immediately prior to procedure a "time out" was called to verify the correct patient, procedure, equipment, support staff and site/side marked as required    Body area: head/neck  Location details: forehead  Laceration length: 2 cm  Tendon involvement: none  Nerve involvement: none  Vascular damage: no  Anesthesia: local infiltration    Anesthesia:  Local Anesthetic: lidocaine 1% without epinephrine    Sedation:  Patient sedated: yes  Sedation type: (Propofol / Precedex)      Wound Dehiscence:  Superficial Wound Dehiscence: simple closure      Procedure Details:  Irrigation solution: saline  Irrigation method: syringe  Amount of cleaning: standard  Debridement: none  Degree of undermining: none  Skin closure: 4-0 Prolene  Number of sutures: 3  Technique: simple  Approximation: close  Approximation difficulty: simple  Dressing: antibiotic ointment  Patient tolerance: patient tolerated the procedure well with no immediate complications

## 2023-02-26 NOTE — PROGRESS NOTES
Cervical Collar Clearance: The patient had a CT scan of the cervical spine demonstrating no acute injury  On exam, the patient had no midline point tenderness or paresthesias/numbness/weakness in the extremities  The patient had full range of motion (was then able to flex, extend, and rotate head laterally) without pain  There were no distracting injuries and the patient was not intoxicated  The patient's cervical spine was cleared radiologically and clinically  Cervical collar removed at this time       Huy Garza MD  2/26/2023 8:43 AM

## 2023-02-26 NOTE — INCIDENTAL FINDINGS
The following findings require follow up:  Radiographic finding   Finding: Left upper pole cortical atrophy/scarring with diminished parenchymal enhancement, nonspecific       Follow up required: PCP     Please notify the following clinician to assist with the follow up:   PCP

## 2023-02-26 NOTE — DISCHARGE INSTR - AVS FIRST PAGE
Follow up with PCP in 5-7 days for suture removal  If unable to get PCP appointment this week, can be seen in urgent care or ER for suture removal as well  For care of the sutures - gently clean the area with soap and water  Do not aggressively scrub  Do not soak/submerge with sutures in place  No dressing needed over sutures, leave open to air

## 2023-02-26 NOTE — ED PROVIDER NOTES
Emergency Department Airway Evaluation and Management Form    History  Obtained from: EMS  Patient has no allergy information on record  Chief Complaint   Patient presents with   • Fall     Per ems - patient fell, + headstrike, unknown LOC, - thinners  + etoh, combative and uncooperative at scene  Eyebrow laceration     Brought in by EMS after fall, intoxicated, hit head  She is combative, needed to be restrained by PD  No meds administered by EMS  Reported hx of alcohol abuse  No past medical history on file  No past surgical history on file  No family history on file  I have reviewed and agree with the history as documented  Review of Systems   Unable to perform ROS: Mental status change     Physical Exam  /67   Pulse 99   Temp 98 5 °F (36 9 °C) (Tympanic)   Resp 14   Wt 68 2 kg (150 lb 5 7 oz)   SpO2 99%     Physical Exam  Vitals and nursing note reviewed  Constitutional:       General: She is in acute distress  Appearance: Normal appearance  She is well-developed  She is obese  She is not diaphoretic  Comments: mark hands cuffed to stretcher  HENT:      Head: Normocephalic  Nose: Nose normal       Mouth/Throat:      Pharynx: No oropharyngeal exudate  Eyes:      General:         Right eye: No discharge  Left eye: No discharge  Pupils: Pupils are equal, round, and reactive to light  Neck:      Vascular: No JVD  Trachea: No tracheal deviation  Comments: No midline spinal tenderness, no step offs or deformity  Cardiovascular:      Rate and Rhythm: Normal rate and regular rhythm  Pulses:           Carotid pulses are 2+ on the right side and 2+ on the left side  Radial pulses are 2+ on the right side and 2+ on the left side  Femoral pulses are 2+ on the right side and 2+ on the left side  Dorsalis pedis pulses are 2+ on the right side and 2+ on the left side  Heart sounds: Normal heart sounds   No murmur heard   Pulmonary:      Effort: Pulmonary effort is normal  No accessory muscle usage or respiratory distress  Breath sounds: No decreased breath sounds, wheezing or rales  Chest:      Chest wall: No deformity, tenderness or edema  Abdominal:      General: Bowel sounds are normal       Palpations: Abdomen is soft  Abdomen is not rigid  There is no mass  Tenderness: There is no abdominal tenderness  There is no rebound  Comments: No ecchymosis or abrasions, no seat belt sign   Musculoskeletal:         General: No tenderness  Normal range of motion  Cervical back: Normal range of motion and neck supple  Skin:     General: Skin is warm and dry  Capillary Refill: Capillary refill takes less than 2 seconds  Findings: No rash  Neurological:      Mental Status: She is alert and oriented to person, place, and time  GCS: GCS eye subscore is 4  GCS verbal subscore is 5  GCS motor subscore is 6  Cranial Nerves: No cranial nerve deficit  Sensory: No sensory deficit  Motor: No abnormal muscle tone        Coordination: Coordination normal       Comments: Normal gait       ED Medications  Medications   tetanus-diphtheria-acellular pertussis (BOOSTRIX) IM injection 0 5 mL (has no administration in time range)   lidocaine (PF) (XYLOCAINE-MPF) 1 % injection 20 mL (has no administration in time range)   dexmedeTOMIDine (Precedex) 400 mcg in sodium chloride 0 9% 100 mL (has no administration in time range)   fentaNYL 1000 mcg in sodium chloride 0 9% 100mL infusion (has no administration in time range)   chlorhexidine (PERIDEX) 0 12 % oral rinse 15 mL (has no administration in time range)   multi-electrolyte (PLASMALYTE-A/ISOLYTE-S PH 7 4) IV solution (has no administration in time range)   fentanyl citrate (PF) 100 MCG/2ML 50 mcg (has no administration in time range)   folic acid 1 mg, thiamine (VITAMIN B1) 100 mg in sodium chloride 0 9 % 100 mL IV piggyback (has no administration in time range)   bacitracin topical ointment 1 small application (has no administration in time range)   ketamine (KETALAR) (200 mg Intramuscular Given 2/25/23 2028)   iohexol (OMNIPAQUE) 350 MG/ML injection (SINGLE-DOSE) 100 mL (100 mL Intravenous Given 2/25/23 2046)     Intubation  Intubation    Date/Time: 2/25/2023 9:20 PM  Performed by: Cesar Negron MD  Authorized by: Cesar Negron MD     Patient location:  ED  Other Assisting Provider: Yes (comment) (Dr Ofelia Solares)    Consent:     Consent obtained:  Emergent situation  Universal protocol:     Patient identity confirmed:  Hospital-assigned identification number and arm band  Pre-procedure details:     Patient status:  Unresponsive    Mallampati score:  2    Pretreatment medications:  Etomidate and ketamine    Paralytics:  Succinylcholine  Indications:     Indications for intubation: respiratory failure and hypoxemia    Procedure details:     Preoxygenation:  Bag valve mask    CPR in progress: no      Intubation method:  Oral    Oral intubation technique:  Glidescope    Laryngoscope blade: Mac 4    Tube size (mm):  7 5    Tube type:  Cuffed    Number of attempts:  1    Ventilation between attempts: no      Cricoid pressure: no      Tube visualized through cords: yes    Placement assessment:     ETT to lip:  21    Tube secured with:  ETT yan    Breath sounds:  Equal and absent over the epigastrium    Placement verification: chest rise, condensation, colorimetric ETCO2 device, direct visualization, equal breath sounds and ETCO2 detector    Post-procedure details:     Patient tolerance of procedure: Tolerated well, no immediate complications  Comments:      Care turned over to trauma pending CXR findings  Notes  Patient too combative to be moved off EMS stretcher, at request of the trauma fellow, patient was sedated with 200mg Ketamine IM in the L thigh  I personally jennifer up medication for nurse to give   Patient eventually calmed down and was placed on supplemental O2, EtCO2 and cardiac monitor  IV started and fluid given  Patient began with some gurrgling and needed suctioning, 26Fr NPA placed in the L nose  Patient will be accompanied to CT by Dr Lauryn Rosas to ensure airway remains patent  Called emergently to CT scan - patient became hypoxic  Decision to intubate  BVM ventilations to get patient to Room 10 for intubation  Note as above  I was present for entire intubation      Final Diagnosis  Final diagnoses:   Acute alcohol intoxication ([facility] [address] )   Closed head injury, initial encounter   Delirium   Acute hypoxemic respiratory failure [facility])     ED Provider  Electronically Signed by     Luis Daniel Chaudhari MD  02/25/23 6551

## 2023-02-26 NOTE — PLAN OF CARE
Problem: MOBILITY - ADULT  Goal: Maintain or return to baseline ADL function  Description: INTERVENTIONS:  -  Assess patient's ability to carry out ADLs; assess patient's baseline for ADL function and identify physical deficits which impact ability to perform ADLs (bathing, care of mouth/teeth, toileting, grooming, dressing, etc )  - Assess/evaluate cause of self-care deficits   - Assess range of motion  - Assess patient's mobility; develop plan if impaired  - Assess patient's need for assistive devices and provide as appropriate  - Encourage maximum independence but intervene and supervise when necessary  - Involve family in performance of ADLs  - Assess for home care needs following discharge   - Consider OT consult to assist with ADL evaluation and planning for discharge  - Provide patient education as appropriate  Outcome: Progressing  Goal: Maintains/Returns to pre admission functional level  Description: INTERVENTIONS:  - Perform BMAT or MOVE assessment daily    - Set and communicate daily mobility goal to care team and patient/family/caregiver  - Collaborate with rehabilitation services on mobility goals if consulted  - Perform Range of Motion 3 times a day  - Reposition patient every 2 hours    - Dangle patient 3 times a day  - Stand patient 3 times a day  - Ambulate patient 3 times a day  - Out of bed to chair 3 times a day   - Out of bed for meals 3 times a day  - Out of bed for toileting  - Record patient progress and toleration of activity level   Outcome: Progressing     Problem: PAIN - ADULT  Goal: Verbalizes/displays adequate comfort level or baseline comfort level  Description: Interventions:  - Encourage patient to monitor pain and request assistance  - Assess pain using appropriate pain scale  - Administer analgesics based on type and severity of pain and evaluate response  - Implement non-pharmacological measures as appropriate and evaluate response  - Consider cultural and social influences on pain and pain management  - Notify physician/advanced practitioner if interventions unsuccessful or patient reports new pain  Outcome: Progressing     Problem: INFECTION - ADULT  Goal: Absence or prevention of progression during hospitalization  Description: INTERVENTIONS:  - Assess and monitor for signs and symptoms of infection  - Monitor lab/diagnostic results  - Monitor all insertion sites, i e  indwelling lines, tubes, and drains  - Monitor endotracheal if appropriate and nasal secretions for changes in amount and color  - [place] appropriate cooling/warming therapies per order  - Administer medications as ordered  - Instruct and encourage patient and family to use good hand hygiene technique  - Identify and instruct in appropriate isolation precautions for identified infection/condition  Outcome: Progressing     Problem: SAFETY ADULT  Goal: Maintain or return to baseline ADL function  Description: INTERVENTIONS:  -  Assess patient's ability to carry out ADLs; assess patient's baseline for ADL function and identify physical deficits which impact ability to perform ADLs (bathing, care of mouth/teeth, toileting, grooming, dressing, etc )  - Assess/evaluate cause of self-care deficits   - Assess range of motion  - Assess patient's mobility; develop plan if impaired  - Assess patient's need for assistive devices and provide as appropriate  - Encourage maximum independence but intervene and supervise when necessary  - Involve family in performance of ADLs  - Assess for home care needs following discharge   - Consider OT consult to assist with ADL evaluation and planning for discharge  - Provide patient education as appropriate  Outcome: Progressing  Goal: Maintains/Returns to pre admission functional level  Description: INTERVENTIONS:  - Perform BMAT or MOVE assessment daily    - Set and communicate daily mobility goal to care team and patient/family/caregiver     - Collaborate with rehabilitation services on mobility goals if consulted  - Perform Range of Motion 3 times a day  - Reposition patient every 2 hours    - Dangle patient 3 times a day  - Stand patient 3 times a day  - Ambulate patient 3 times a day  - Out of bed to chair 3 times a day   - Out of bed for meals 3 times a day  - Out of bed for toileting  - Record patient progress and toleration of activity level   Outcome: Progressing  Goal: Patient will remain free of falls  Description: INTERVENTIONS:  - Educate patient/family on patient safety including physical limitations  - Instruct patient to call for assistance with activity   - Consult OT/PT to assist with strengthening/mobility   - Keep Call bell within reach  - Keep bed low and locked with side rails adjusted as appropriate  - Keep care items and personal belongings within reach  - Initiate and maintain comfort rounds  - Make Fall Risk Sign visible to staff  - Offer Toileting every 2 Hours, in advance of need  - Initiate/Maintain bed alarm  - Obtain necessary fall risk management equipment: n/a  - Apply yellow socks and bracelet for high fall risk patients  - Consider moving patient to room near nurses station  Outcome: Progressing     Problem: DISCHARGE PLANNING  Goal: Discharge to home or other facility with appropriate resources  Description: INTERVENTIONS:  - Identify barriers to discharge w/patient and caregiver  - Arrange for needed discharge resources and transportation as appropriate  - Identify discharge learning needs (meds, wound care, etc )  - Arrange for interpretive services to assist at discharge as needed  - Refer to Case Management Department for coordinating discharge planning if the patient needs post-hospital services based on physician/advanced practitioner order or complex needs related to functional status, cognitive ability, or social support system  Outcome: Progressing     Problem: Knowledge Deficit  Goal: Patient/family/caregiver demonstrates understanding of disease process, treatment plan, medications, and discharge instructions  Description: Complete learning assessment and assess knowledge base  Interventions:  - Provide teaching at level of understanding  - Provide teaching via preferred learning methods  Outcome: Progressing     Problem: Nutrition/Hydration-ADULT  Goal: Nutrient/Hydration intake appropriate for improving, restoring or maintaining nutritional needs  Description: Monitor and assess patient's nutrition/hydration status for malnutrition  Collaborate with interdisciplinary team and initiate plan and interventions as ordered  Monitor patient's weight and dietary intake as ordered or per policy  Utilize nutrition screening tool and intervene as necessary  Determine patient's food preferences and provide high-protein, high-caloric foods as appropriate       INTERVENTIONS:  - Monitor oral intake, urinary output, labs, and treatment plans  - Assess nutrition and hydration status and recommend course of action  - Evaluate amount of meals eaten  - Assist patient with eating if necessary   - Allow adequate time for meals  - Recommend/ encourage appropriate diets, oral nutritional supplements, and vitamin/mineral supplements  - Order, calculate, and assess calorie counts as needed  - Recommend, monitor, and adjust tube feedings and TPN/PPN based on assessed needs  - Assess need for intravenous fluids  - Provide specific nutrition/hydration education as appropriate  - Include patient/family/caregiver in decisions related to nutrition  Outcome: Progressing     Problem: SAFETY,RESTRAINT: NV/NON-SELF DESTRUCTIVE BEHAVIOR  Goal: Remains free of harm/injury (restraint for non violent/non self-detsructive behavior)  Description: INTERVENTIONS:  - Instruct patient/family regarding restraint use   - Assess and monitor physiologic and psychological status   - Provide interventions and comfort measures to meet assessed patient needs   - Identify and implement measures to help patient regain control  - Assess readiness for release of restraint   Outcome: Progressing  Goal: Returns to optimal restraint-free functioning  Description: INTERVENTIONS:  - Assess the patient's behavior and symptoms that indicate continued need for restraint  - Identify and implement measures to help patient regain control  - Assess readiness for release of restraint   Outcome: Progressing     Problem: Prexisting or High Potential for Compromised Skin Integrity  Goal: Skin integrity is maintained or improved  Description: INTERVENTIONS:  - Identify patients at risk for skin breakdown  - Assess and monitor skin integrity  - Assess and monitor nutrition and hydration status  - Monitor labs   - Assess for incontinence   - Turn and reposition patient  - Assist with mobility/ambulation  - Relieve pressure over bony prominences  - Avoid friction and shearing  - Provide appropriate hygiene as needed including keeping skin clean and dry  - Evaluate need for skin moisturizer/barrier cream  - Collaborate with interdisciplinary team   - Patient/family teaching  - Consider wound care consult   Outcome: Progressing

## 2023-02-26 NOTE — ED PROCEDURE NOTE
Procedure  Intubation    Date/Time: 2/25/2023 9:38 PM  Performed by: Guanakito Cortés MD  Authorized by: Guanakito Cortés MD     Patient location:  ED  Other Assisting Provider: No    Consent:     Consent obtained:  Emergent situation  Universal protocol:     Patient identity confirmed:  Arm band  Pre-procedure details:     Patient status:  Altered mental status    Pretreatment medications:  Etomidate and ketamine    Paralytics:  Succinylcholine  Indications:     Indications for intubation: respiratory failure, airway protection and hypoxemia    Procedure details:     Preoxygenation:  Bag valve mask    CPR in progress: no      Intubation method:  Oral    Oral intubation technique:  Glidescope    Laryngoscope size: S3 hyperangulated blade  Tube size (mm):  7 5    Tube type:  Cuffed    Number of attempts:  1  Placement assessment:     ETT to lip:  22    Tube secured with:  ETT yan    Breath sounds:  Equal    Placement verification: chest rise, condensation, colorimetric ETCO2 device, CXR verification, direct visualization, equal breath sounds, ETCO2 detector and tube exhalation      CXR findings:  ETT in proper place  Post-procedure details:     Patient tolerance of procedure:   Tolerated well, no immediate complications                     Guanakito Cortés MD  02/25/23 7992

## 2023-02-26 NOTE — DISCHARGE SUMMARY
Discharge Summary - David Mohs 59 y o  female MRN: [id number]    Unit/Bed#: ICU 01 Encounter: [id number]    Admission Date:   Admission Orders (From admission, onward)     Ordered        02/25/23 2127  Inpatient Admission  Once                        Admitting Diagnosis: Delirium [R41 0]  Acute alcohol intoxication ([facility] [address] ) [F10 929]  Closed head injury, initial encounter [S48 85NP]  Alcoholic intoxication without complication ([facility] [address] ) [F60 510]  Acute hypoxemic respiratory failure ([facility] [address] ) [J96 01]  Unspecified multiple injuries, initial encounter [T07  XXXA]    HPI: Per Inocencia Owusu MD "Patient presented as level B trauma s/p fall while intoxicated at her daughter's bachelorette party  Required ketamine for agitation, though [address]  Suffered hypoxic respiratory failure in CT scanner requiring intubation, then CT scan was completed  Admitted to ICU for ventilator management, monitoring for ethanol intoxication      Per patient's family she has struggled with ethanol abuse on and off for years, and has been in treatment several times  They believe that she was sober until Tuesday, five days ago, when she started drinking again  Patient's family states that when she withdraws she shakes and vomits but has never had withdrawal seizures "    Procedures Performed:   Orders Placed This Encounter   Procedures   • Intubation   • Intubation   • Laceration repair       Summary of Hospital Course: Patient was extubated this AM and was provided a regular diet  She is stable for discharge to home       Complications: none    Discharge Diagnosis:   Principal Problem:    Fall  Active Problems:    Acute respiratory failure with hypoxia (HCC)    Laceration of left eyebrow    Lactic acid acidosis    Alcohol intoxication [facility])        Medical Problems     Resolved Problems  Date Reviewed: 2/26/2023   None         Condition at Discharge: good         Discharge instructions/Information to patient and family:   See after visit summary for information provided to patient and family  Provisions for Follow-Up Care:  See after visit summary for information related to follow-up care and any pertinent home health orders  PCP: Rodger Haro MD    Disposition: Home    Planned Readmission: No      Discharge Statement   I spent 15 minutes discharging the patient  This time was spent on the day of discharge  I had direct contact with the patient on the day of discharge  Additional documentation is required if more than 30 minutes were spent on discharge  Discharge Medications:  See after visit summary for reconciled discharge medications provided to patient and family

## 2023-02-26 NOTE — RESPIRATORY THERAPY NOTE
RT Protocol Note  Teena Mauricio 59 y o  female MRN: [id number]  Unit/Bed#: [facility] Encounter: [id number]    Assessment    Active Problems:    * No active hospital problems  *      Home Pulmonary Medications:  na       No past medical history on file  Social History     Socioeconomic History    Marital status: Single     Spouse name: Not on file    Number of children: Not on file    Years of education: Not on file    Highest education level: Not on file   Occupational History    Not on file   Tobacco Use    Smoking status: Not on file    Smokeless tobacco: Not on file   Substance and Sexual Activity    Alcohol use: Not on file    Drug use: Not on file    Sexual activity: Not on file   Other Topics Concern    Not on file   Social History Narrative    Not on file     Social Determinants of Health     Financial Resource Strain: Not on file   Food Insecurity: Not on file   Transportation Needs: Not on file   Physical Activity: Not on file   Stress: Not on file   Social Connections: Not on file   Intimate Partner Violence: Not on file   Housing Stability: Not on file       Subjective         Objective    Physical Exam:   Assessment Type: Assess only  General Appearance: Sedated  Respiratory Pattern: Assisted  Chest Assessment: Chest expansion symmetrical  Bilateral Breath Sounds: Coarse  Cough: Productive  Suction: Oral, ET Tube  O2 Device: Ventilator    Vitals:  Blood pressure 140/67, pulse 99, temperature 98 5 °F (36 9 °C), temperature source Tympanic, resp  rate 14, weight 68 2 kg (150 lb 5 7 oz), SpO2 99 %  Imaging and other studies: I have personally reviewed pertinent reports  O2 Device: Ventilator     Plan    Respiratory Plan: Vent/NIV/HFNC        Resp Comments: (P) Pt  received from ER intubated via transport vent and placed on [place]rozco G5 at this time  Pt's breath sounds are slight coarse bilaterally and pt has no known Pulmonary Hx  according to chart    No bronchodilators are indicated at this time   Will continue to assess and monitor pt per Respiratory protocol

## 2023-02-26 NOTE — PLAN OF CARE
Problem: MOBILITY - ADULT  Goal: Maintain or return to baseline ADL function  Description: INTERVENTIONS:  -  Assess patient's ability to carry out ADLs; assess patient's baseline for ADL function and identify physical deficits which impact ability to perform ADLs (bathing, care of mouth/teeth, toileting, grooming, dressing, etc )  - Assess/evaluate cause of self-care deficits   - Assess range of motion  - Assess patient's mobility; develop plan if impaired  - Assess patient's need for assistive devices and provide as appropriate  - Encourage maximum independence but intervene and supervise when necessary  - Involve family in performance of ADLs  - Assess for home care needs following discharge   - Consider OT consult to assist with ADL evaluation and planning for discharge  - Provide patient education as appropriate  Outcome: Progressing     Problem: PAIN - ADULT  Goal: Verbalizes/displays adequate comfort level or baseline comfort level  Description: Interventions:  - Encourage patient to monitor pain and request assistance  - Assess pain using appropriate pain scale  - Administer analgesics based on type and severity of pain and evaluate response  - Implement non-pharmacological measures as appropriate and evaluate response  - Consider cultural and social influences on pain and pain management  - Notify physician/advanced practitioner if interventions unsuccessful or patient reports new pain  Outcome: Progressing     Problem: INFECTION - ADULT  Goal: Absence or prevention of progression during hospitalization  Description: INTERVENTIONS:  - Assess and monitor for signs and symptoms of infection  - Monitor lab/diagnostic results  - Monitor all insertion sites, i e  indwelling lines, tubes, and drains  - Monitor endotracheal if appropriate and nasal secretions for changes in amount and color  - [place] appropriate cooling/warming therapies per order  - Administer medications as ordered  - Instruct and encourage patient and family to use good hand hygiene technique  - Identify and instruct in appropriate isolation precautions for identified infection/condition  Outcome: Progressing     Problem: SAFETY ADULT  Goal: Maintain or return to baseline ADL function  Description: INTERVENTIONS:  -  Assess patient's ability to carry out ADLs; assess patient's baseline for ADL function and identify physical deficits which impact ability to perform ADLs (bathing, care of mouth/teeth, toileting, grooming, dressing, etc )  - Assess/evaluate cause of self-care deficits   - Assess range of motion  - Assess patient's mobility; develop plan if impaired  - Assess patient's need for assistive devices and provide as appropriate  - Encourage maximum independence but intervene and supervise when necessary  - Involve family in performance of ADLs  - Assess for home care needs following discharge   - Consider OT consult to assist with ADL evaluation and planning for discharge  - Provide patient education as appropriate  Outcome: Progressing    Goal: Patient will remain free of falls  Description: INTERVENTIONS:  - Educate patient/family on patient safety including physical limitations  - Instruct patient to call for assistance with activity   - Consult OT/PT to assist with strengthening/mobility   - Keep Call bell within reach  - Keep bed low and locked with side rails adjusted as appropriate  - Keep care items and personal belongings within reach  - Initiate and maintain comfort rounds  - Make Fall Risk Sign visible to staff  - Offer Toileting every 2 Hours, in advance of need  - Apply yellow socks and bracelet for high fall risk patients  - Consider moving patient to room near nurses station  Outcome: Progressing     Problem: DISCHARGE PLANNING  Goal: Discharge to home or other facility with appropriate resources  Description: INTERVENTIONS:  - Identify barriers to discharge w/patient and caregiver  - Arrange for needed discharge resources and transportation as appropriate  - Identify discharge learning needs (meds, wound care, etc )  - Arrange for interpretive services to assist at discharge as needed  - Refer to Case Management Department for coordinating discharge planning if the patient needs post-hospital services based on physician/advanced practitioner order or complex needs related to functional status, cognitive ability, or social support system  Outcome: Progressing     Problem: Knowledge Deficit  Goal: Patient/family/caregiver demonstrates understanding of disease process, treatment plan, medications, and discharge instructions  Description: Complete learning assessment and assess knowledge base  Interventions:  - Provide teaching at level of understanding  - Provide teaching via preferred learning methods  Outcome: Progressing     Problem: Nutrition/Hydration-ADULT  Goal: Nutrient/Hydration intake appropriate for improving, restoring or maintaining nutritional needs  Description: Monitor and assess patient's nutrition/hydration status for malnutrition  Collaborate with interdisciplinary team and initiate plan and interventions as ordered  Monitor patient's weight and dietary intake as ordered or per policy  Utilize nutrition screening tool and intervene as necessary  Determine patient's food preferences and provide high-protein, high-caloric foods as appropriate       INTERVENTIONS:  - Monitor oral intake, urinary output, labs, and treatment plans  - Assess nutrition and hydration status and recommend course of action  - Evaluate amount of meals eaten  - Assist patient with eating if necessary   - Allow adequate time for meals  - Recommend/ encourage appropriate diets, oral nutritional supplements, and vitamin/mineral supplements  - Order, calculate, and assess calorie counts as needed  - Recommend, monitor, and adjust tube feedings and TPN/PPN based on assessed needs  - Assess need for intravenous fluids  - Provide specific nutrition/hydration education as appropriate  - Include patient/family/caregiver in decisions related to nutrition  Outcome: Progressing     Problem: SAFETY,RESTRAINT: NV/NON-SELF DESTRUCTIVE BEHAVIOR  Goal: Remains free of harm/injury (restraint for non violent/non self-detsructive behavior)  Description: INTERVENTIONS:  - Instruct patient/family regarding restraint use   - Assess and monitor physiologic and psychological status   - Provide interventions and comfort measures to meet assessed patient needs   - Identify and implement measures to help patient regain control  - Assess readiness for release of restraint   Outcome: Progressing  Goal: Returns to optimal restraint-free functioning  Description: INTERVENTIONS:  - Assess the patient's behavior and symptoms that indicate continued need for restraint  - Identify and implement measures to help patient regain control  - Assess readiness for release of restraint   Outcome: Progressing     Problem: Prexisting or High Potential for Compromised Skin Integrity  Goal: Skin integrity is maintained or improved  Description: INTERVENTIONS:  - Identify patients at risk for skin breakdown  - Assess and monitor skin integrity  - Assess and monitor nutrition and hydration status  - Monitor labs   - Assess for incontinence   - Turn and reposition patient  - Assist with mobility/ambulation  - Relieve pressure over bony prominences  - Avoid friction and shearing  - Provide appropriate hygiene as needed including keeping skin clean and dry  - Evaluate need for skin moisturizer/barrier cream  - Collaborate with interdisciplinary team   - Patient/family teaching  - Consider wound care consult   Outcome: Progressing

## 2023-02-26 NOTE — PROGRESS NOTES
Daily Progress Note - [address] 59 y o  female MRN: [id number]  Unit/Bed#: ICU 01 Encounter: [id number]        ----------------------------------------------------------------------------------------  HPI/24hr events: Admitted yesterday for ethanol intoxication presenting as trauma with L forehead laceration  Pan-scan negative for additional injuries  Required intubation due to inability to protect airway / desat in CT scanner  Woke up and followed commands off sedation around 0330 but was intermittently apneic on PS but was also intermittently agitated, shaking and striking the bed etc despite restraints  Placed back on sedation with precedex       ---------------------------------------------------------------------------------------  SUBJECTIVE  N/A    Review of Systems    ---------------------------------------------------------------------------------------  Assessment and Plan:    Neuro:   • Diagnosis: Alcohol intoxication s/p ketamine / intubation, reportedly drinks up to 1/4 gallon of hard liquor daily  ? Thiamine/Folate  ? Propofol gtt, precedex gtt  ? Likely phenobarb later tonight  ? Attempt extubation tomorrow AM  • Diagnosis: Analgesia  ? Fentanyl 25 mcg  • Diagnosis: History of depression  ? Chantix / Prozac outpatient, will restart when taking PO        CV:   • Diagnosis: HTN likely 2/2 agitation  ? Will treat PRN if remains high after adequate sedation / anxiolysis        Pulm:  • Diagnosis: Intubation  ? AC/VC, no PS trial tonight  ? Vent bundle  ? Attempt SAT/SBT tomorrow        GI:   • Diagnosis: No acute issues  ? IV PPI, no enteric nutrition tonight        :   • Diagnosis: CKD, last Cr in records 0 7, currently 0 85  - Benitez in place  - Monitor UOP  - D/c once extubated  ?  Avoid nephrotoxic agents, monitor Cr        F/E/N:   • Fluids: Isolyte 125  • Electrolytes: Check / replete PRN  • Nutrition: NPO for now        Heme/Onc:   • Diagnosis: No acute concerns        Endo:   • Diagnosis: No acute concerns, not hyper/hypo-glycemic        ID:   • Diagnosis: No indication of infection        MSK/Skin:   • Diagnosis: C-collar in place from trauma bay, CT negative for spinal fracture  ? Clear C-collar clinically tomorrow    Disposition: Continue Critical Care   Code Status: Level 1 - Full Code    ---------------------------------------------------------------------------------------    ICU CORE MEASURES    Prophylaxis   VTE Pharmacologic Prophylaxis: Lovenox trauma protocol  VTE Mechanical Prophylaxis: sequential compression device  Stress Ulcer Prophylaxis: Pantoprazole IV     ABCDE Protocol (if indicated)  Plan to perform spontaneous awakening trial today? Yes  Plan to perform spontaneous breathing trial today? Yes  Obvious barriers to extubation? Yes  CAM-ICU: Will perform    Invasive Devices Review  Invasive Devices     Peripheral Intravenous Line  Duration           Peripheral IV 02/25/23 Left Antecubital <1 day    Peripheral IV 02/25/23 Right Forearm <1 day    Peripheral IV 02/26/23 Left;Ventral (anterior) Forearm <1 day          Drain  Duration           NG/OG/Enteral Tube Orogastric Center mouth <1 day    Urethral Catheter Double-lumen <1 day          Airway  Duration           ETT  Oral 7 5 mm <1 day              Can any invasive devices be discontinued today? Yes    ---------------------------------------------------------------------------------------      Objective       OBJECTIVE      Physical Exam  HENT:      Head:      Comments: 2cm laceration lateral to L eyebrow s/p repair  Eyes:      Pupils: Pupils are equal, round, and reactive to light  Comments: 3mm and reactive  Neck:      Comments: C-collar in place  Cardiovascular:      Rate and Rhythm: Normal rate  Pulses: Normal pulses  Pulmonary:      Comments: Breathing comfortably on ventilator, settings CMV 16 / 400 / 40 / 6  Abdominal:      Palpations: Abdomen is soft  Tenderness:  There is no abdominal tenderness  Skin:     General: Skin is warm and dry     Neurological:      Comments: Moves all 4 extremeties to command    Vitals   Vitals:    23 0202 23 0257 23 0301 23 0333   BP: (!) 73/53 (!) 81/55 (!) 81/56 92/64   Pulse: 64 66 70 78   Resp: 16 (!) 26 (!) 23 21   Temp:       TempSrc:       SpO2: 99% 99% 100% 99%   Weight:         Temp (24hrs), Av 2 °F (36 8 °C), Min:97 5 °F (36 4 °C), Max:98 5 °F (36 9 °C)  Current: Temperature: 98 5 °F (36 9 °C)    Respiratory:       Invasive/non-invasive ventilation settings   Respiratory    Lab Data (Last 4 hours)    None         O2/Vent Data (Last 4 hours)    None                Intake and Output  I/O        07 0700  07 0700    I V  (mL/kg)  2488 3 (36 5)    Total Intake(mL/kg)  2488 3 (36 5)    Urine (mL/kg/hr)  1675    Total Output  1675    Net  +813 3                Laboratory and Diagnostics:  Results from last 7 days   Lab Units 23   WBC Thousand/uL  --  10 14   HEMOGLOBIN g/dL  --  14 6   I STAT HEMOGLOBIN g/dl 15 6*  --    HEMATOCRIT %  --  45 1   HEMATOCRIT, ISTAT % 46  --    PLATELETS Thousands/uL  --  330   NEUTROS PCT %  --  49   MONOS PCT %  --  5     Results from last 7 days   Lab Units 23   SODIUM mmol/L  --  143   POTASSIUM mmol/L  --  4 1   CHLORIDE mmol/L  --  109*   CO2 mmol/L  --  25   CO2, I-STAT mmol/L 29  --    ANION GAP mmol/L  --  9   BUN mg/dL  --  12   CREATININE mg/dL  --  0 85   CALCIUM mg/dL  --  9 0   GLUCOSE RANDOM mg/dL  --  110   ALT U/L  --  22   AST U/L  --  26   ALK PHOS U/L  --  88   ALBUMIN g/dL  --  3 8   TOTAL BILIRUBIN mg/dL  --  0 16*     Results from last 7 days   Lab Units 23   MAGNESIUM mg/dL 2 1   PHOSPHORUS mg/dL 4 0               Results from last 7 days   Lab Units 23   LACTIC ACID mmol/L 2 9*     ABG:    VBG:          Micro          Height and Weights         There is no height or weight on file to calculate BMI  Weight (last 2 days)     Date/Time Weight    02/25/23 2032 68 2 (150 35)            Nutrition       Diet Orders   (From admission, onward)             Start     Ordered    02/25/23 2126  Diet NPO  Diet effective now        References:    Nutrtion Support Algorithm Enteral vs  Parenteral   Question Answer Comment   Diet Type NPO    RD to adjust diet per protocol?  Yes        02/25/23 2127                Active Medications  Scheduled Meds:  Current Facility-Administered Medications   Medication Dose Route Frequency Provider Last Rate   • chlorhexidine  15 mL Mouth/Throat Q12H [address] Lissette Escobar MD     • dexmedetomidine  0 1-1 5 mcg/kg/hr Intravenous Titrated Enrique Farris MD 1 5 mcg/kg/hr (02/26/23 0358)   • enoxaparin  30 mg Subcutaneous Q12H [address] Lissette Escobar MD     • fentanyl citrate (PF)  50 mcg Intravenous Q2H PRN Lissette Escobar MD     • folic acid 1 mg, thiamine 100 mg in 0 9% sodium chloride 100 mL IVPB   Intravenous Daily Lissette Escobar MD     • lidocaine (PF)  20 mL Infiltration Once Reggie Foster MD     • multi-electrolyte  100 mL/hr Intravenous Continuous Lissette Escobar  mL/hr (02/25/23 2348)   • norepinephrine          • norepinephrine  1-30 mcg/min Intravenous Titrated Ria Montanez PA-C 2 mcg/min (02/26/23 0320)   • pantoprazole  40 mg Intravenous Q24H [address] Lissette Escobar MD       Continuous Infusions:  dexmedetomidine, 0 1-1 5 mcg/kg/hr, Last Rate: 1 5 mcg/kg/hr (02/26/23 0358)  multi-electrolyte, 100 mL/hr, Last Rate: 100 mL/hr (02/25/23 2348)  norepinephrine, 1-30 mcg/min, Last Rate: 2 mcg/min (02/26/23 0320)      PRN Meds:   fentanyl citrate (PF), 50 mcg, Q2H PRN        Allergies   Not on File  ---------------------------------------------------------------------------------------  Advance Directive and Living Will:      Power of :    POLST: ---------------------------------------------------------------------------------------  Care Time Delivered:   Upon my evaluation, this patient had a high probability of imminent or life-threatening deterioration due to hypoxic respiratory failure, which required my direct attention, intervention, and personal management  I have personally provided 25 minutes of critical care time, exclusive of procedures, teaching, family meetings, and any prior time recorded by providers other than myself  Davidson Monae MD      Portions of the record may have been created with voice recognition software  Occasional wrong word or "sound a like" substitutions may have occurred due to the inherent limitations of voice recognition software    Read the chart carefully and recognize, using context, where substitutions have occurred

## 2023-02-26 NOTE — CONSULTS
ICU 8 [facility] 59 y o  female MRN: [id number]  Unit/Bed#: EVELYN Encounter: [id number]        ----------------------------------------------------------------------------------------  HPI/24hr events: Patient presented as level B trauma s/p fall while intoxicated at her daughter's [employer] party  Required ketamine for agitation, though [address]  Suffered hypoxic respiratory failure in CT scanner requiring intubation, then CT scan was completed  Admitted to ICU for ventilator management, monitoring for ethanol intoxication  Per patient's family she has struggled with ethanol abuse on and off for years, and has been in treatment several times  They believe that she was sober until Tuesday, five days ago, when she started drinking again  Patient's family states that when she withdraws she shakes and vomits but has never had withdrawal seizures      ---------------------------------------------------------------------------------------  SUBJECTIVE  N/A    Review of Systems   Unable to perform ROS: Intubated       ---------------------------------------------------------------------------------------  Assessment and Plan:    Neuro:   • Diagnosis: Alcohol intoxication s/p ketamine / intubation, reportedly drinks up to 1/4 gallon of hard liquor daily  o Thiamine/Folate  o Propofol gtt, precedex gtt  o Likely phenobarb later tonight  o Attempt extubation tomorrow AM  • Diagnosis: Analgesia  o Fentanyl 25 mcg  • Diagnosis: History of depression  o Chantix / Prozac outpatient, will restart when taking PO      CV:   • Diagnosis: HTN likely 2/2 agitation  o Will treat PRN if remains high after adequate sedation / anxiolysis      Pulm:  • Diagnosis: Intubation  o AC/VC, no PS trial tonight  o Vent bundle  o Attempt SAT/SBT tomorrow      GI:   • Diagnosis: No acute issues  o IV PPI, no enteric nutrition tonight      :   • Diagnosis: CKD, last Cr in records 0 7, currently 0 85  - Benitez in place  • Monitor UOP  • D/c once extubated  o Avoid nephrotoxic agents, monitor Cr      F/E/N:   • Fluids: Isolyte 125  • Electrolytes: Check / replete PRN  • Nutrition: NPO for now      Heme/Onc:   • Diagnosis: No acute concerns      Endo:   • Diagnosis: No acute concerns, not hyper/hypo-glycemic      ID:   • Diagnosis: No indication of infection      MSK/Skin:   • Diagnosis: C-collar in place from trauma bay, CT negative for spinal fracture  o Clear C-collar clinically tomorrow      Disposition: Admit to Critical Care   Code Status: Level 1 - Full Code  ---------------------------------------------------------------------------------------  ICU CORE MEASURES    Prophylaxis   VTE Pharmacologic Prophylaxis: [facility]  VTE Mechanical Prophylaxis: sequential compression device  Stress Ulcer Prophylaxis: Pantoprazole IV     ABCDE Protocol (if indicated)  Plan to perform spontaneous awakening trial today? No  Plan to perform spontaneous breathing trial today? No  Obvious barriers to extubation? Yes  CAM-ICU: N/A    Invasive Devices Review  Invasive Devices     Peripheral Intravenous Line  Duration           Peripheral IV 23 Left Antecubital <1 day    Peripheral IV 23 Right Forearm <1 day          Drain  Duration           NG/OG/Enteral Tube Orogastric Center mouth <1 day    Urethral Catheter Double-lumen <1 day          Airway  Duration           ETT  Oral 7 5 mm <1 day              Can any invasive devices be discontinued today?  No  ---------------------------------------------------------------------------------------  OBJECTIVE    Vitals   Vitals:    23 2100   BP: (!) 180/97 (!) 177/99 (!) 144/105 140/67   Pulse: 104 (!) 119 (!) 121 99   Resp: 20 20 14 14   Temp: 98 5 °F (36 9 °C)      TempSrc: Tympanic      SpO2: 99% 98% 99% 99%   Weight: 68 2 kg (150 lb 5 7 oz)        Temp (24hrs), Av 5 °F (36 9 °C), Min:98 5 °F (36 9 °C), Max:98 5 °F (36 9 °C)  Current: Temperature: 98 5 °F (36 9 °C)      Respiratory:  SpO2: SpO2: 99 %       Invasive/non-invasive ventilation settings   Respiratory    Lab Data (Last 4 hours)    None         O2/Vent Data (Last 4 hours)    None                Intake and Output  I/O       02/24 0701 02/25 0700 02/25 0701 02/26 0700    Urine (mL/kg/hr)  700    Total Output  700    Net  -700                  Laboratory and Diagnostics:  Results from last 7 days   Lab Units 02/25/23 2039 02/25/23 2038   WBC Thousand/uL  --  10 14   HEMOGLOBIN g/dL  --  14 6   I STAT HEMOGLOBIN g/dl 15 6*  --    HEMATOCRIT %  --  45 1   HEMATOCRIT, ISTAT % 46  --    PLATELETS Thousands/uL  --  330   NEUTROS PCT %  --  49   MONOS PCT %  --  5     Results from last 7 days   Lab Units 02/25/23 2039 02/25/23 2038   SODIUM mmol/L  --  143   POTASSIUM mmol/L  --  4 1   CHLORIDE mmol/L  --  109*   CO2 mmol/L  --  25   CO2, I-STAT mmol/L 29  --    ANION GAP mmol/L  --  9   BUN mg/dL  --  12   CREATININE mg/dL  --  0 85   CALCIUM mg/dL  --  9 0   GLUCOSE RANDOM mg/dL  --  110   ALT U/L  --  22   AST U/L  --  26   ALK PHOS U/L  --  88   ALBUMIN g/dL  --  3 8   TOTAL BILIRUBIN mg/dL  --  0 16*     Results from last 7 days   Lab Units 02/25/23 2038   MAGNESIUM mg/dL 2 1   PHOSPHORUS mg/dL 4 0                   ABG:    VBG:          Micro        Physical Exam  HENT:      Head:      Comments: 2cm laceration lateral to L eyebrow  Eyes:      Pupils: Pupils are equal, round, and reactive to light  Comments: 3mm and reactive   Neck:      Comments: C-collar in place  Cardiovascular:      Rate and Rhythm: Normal rate  Pulses: Normal pulses  Pulmonary:      Comments: Breathing comfortably on ventilator  Abdominal:      Palpations: Abdomen is soft  Tenderness: There is no abdominal tenderness  Skin:     General: Skin is warm and dry  Neurological:      Comments: Sedated GCS 3T             Imaging:  I have personally reviewed pertinent reports  Height and Weights         There is no height or weight on file to calculate BMI  Weight (last 2 days)     Date/Time Weight    02/25/23 2032 68 2 (150 35)            Nutrition       Diet Orders   (From admission, onward)             Start     Ordered    02/25/23 2126  Diet NPO  Diet effective now        References:    Nutrtion Support Algorithm Enteral vs  Parenteral   Question Answer Comment   Diet Type NPO    RD to adjust diet per protocol?  Yes        02/25/23 2127                Active Medications  Scheduled Meds:  Current Facility-Administered Medications   Medication Dose Route Frequency Provider Last Rate   • bacitracin  1 small application Topical Once Buena Halsted, MD     • chlorhexidine  15 mL Mouth/Throat Q12H [address] Buena Halsted, MD     • dexmedetomidine  0 1-0 7 mcg/kg/hr Intravenous Titrated Brennan Curling, MD     • fentaNYL  25 mcg/hr Intravenous Continuous Brennan Curling, MD     • fentanyl citrate (PF)  50 mcg Intravenous Q2H PRN Buena Halsted, MD     • [START ON 3/83/5790] folic acid 1 mg, thiamine 100 mg in 0 9% sodium chloride 100 mL IVPB   Intravenous Daily Buena Halsted, MD     • lidocaine (PF)  20 mL Infiltration Once Brennan Curling, MD     • multi-electrolyte  100 mL/hr Intravenous Continuous Buena Halsted, MD     • propofol  5-50 mcg/kg/min Intravenous Titrated Georgena Spatz, MD     • tetanus-diphtheria-acellular pertussis  0 5 mL Intramuscular Once Johana Sutherland MD       Continuous Infusions:  dexmedetomidine, 0 1-0 7 mcg/kg/hr  fentaNYL, 25 mcg/hr  multi-electrolyte, 100 mL/hr  propofol, 5-50 mcg/kg/min      PRN Meds:   fentanyl citrate (PF), 50 mcg, Q2H PRN        Allergies   Not on File  ---------------------------------------------------------------------------------------  Advance Directive and Living Will:      Power of :    POLST:    ---------------------------------------------------------------------------------------  Care Time Delivered:   Upon my evaluation, this patient had a high probability of imminent or life-threatening deterioration due to respiratory failure, which required my direct attention, intervention, and personal management  I have personally provided 25 minutes of critical care time, exclusive of procedures, teaching, family meetings, and any prior time recorded by providers other than myself  Blanca Cope MD      Portions of the record may have been created with voice recognition software  Occasional wrong word or "sound a like" substitutions may have occurred due to the inherent limitations of voice recognition software    Read the chart carefully and recognize, using context, where substitutions have occurred

## 2023-02-26 NOTE — QUICK NOTE
Patient received a total of 200 mcg of propofol in multiple doses to aid with sedation until drip could be started  Propofol was administered by this provider

## 2023-02-26 NOTE — PROGRESS NOTES
Pastoral Care Progress Note    2023  Patient: Daniella Santillan : 1965  Admission Date & Time: 2023  MRN: [id number] CSN: [id number]           had been in trauma bay with Pt brought in  Did other rounding while Pt being treated in ED, returned to see if any follow up was needed with Pt, only to find Pt had to be intubated and was in ICU 1  Went to Pt room and prayed for her healing at bedside

## 2023-02-26 NOTE — H&P
H&P - Trauma   Jackie Ramsey 59 y o  female MRN: [id number]  Unit/Bed#: TR 02 Encounter: [id number]    Trauma Alert: Level B   Model of Arrival: Ambulance    Trauma Team: Attending Carlos Shane, Residents Live Vieyra and Fellow Jimena  Consultants:     Other: {routine consult; Epic consult order placed and consultant notified (via phone/text @ time 2105); Assessment/Plan   Active Problems / Assessment:   Fall  EtOH intoxication  Facial laceration  Acute respiratory failure     Plan:   - Admit to surgical critical care  - Completion CT scan  - CIWA protocol with phenobarbital  - Wean ventilation as tolerated  - Maintain c-collar precautions until be safely cleared  - Benitez  - Strict I's and O's  - N p o /NGT    History of Present Illness     Chief Complaint:   Mechanism:Fall     HPI:    Jackie Ramsey is a 59 y o  female with known EtOH abuse, who presents as a level B trauma activation s/p fall  Patient was at her daughter's bacheloret[employer] party and became heavily intoxicated  She wandered outside and tripped and fell forward onto her face  When EMS and police arrived patient was combative spitting cursing biting and fighting  Upon arrival to the ER she continued to be combative with unwillingness to follow directions  This resulted in patient receiving a ketamine bolus  Patient's GCS was 15 despite being uncooperative  Immediate injuries only significant for a small laceration over the left brow  While in CT patient became acutely hypoxic with SPO2 in the 50s and heavy secretions  CT was stopped before completion and patient was transitioned to a room where she was intubated for airway protection  Review of Systems   Unable to perform ROS: Mental status change   Allergic/Immunologic: Negative for food allergies  12-point, complete review of systems was reviewed and negative except as stated above  Historical Information     No past medical history on file    No past surgical history on file    Unable to obtain history due to Acute intoxication       There is no immunization history for the selected administration types on file for this patient  Last Tetanus: Unknown  Family History: Non-contributory     Meds/Allergies   current meds:   Current Facility-Administered Medications   Medication Dose Route Frequency   • tetanus-diphtheria-acellular pertussis (BOOSTRIX) IM injection 0 5 mL  0 5 mL Intramuscular Once    and PTA meds:   None     Allergies have not been reviewed; Not on File    Objective   Initial Vitals:        Primary Survey:   Airway:        Status: patent;        Pre-hospital Interventions: none        Hospital Interventions: none  Breathing:        Pre-hospital Interventions: oxygen       Effort: normal       Right breath sounds: normal       Left breath sounds: normal  Circulation:        Rhythm: regular       Rate: regular   Right Pulses Left Pulses    R radial: 2+    R pedal: 2+     L radial: 2+    L pedal: 2+       Disability:        GCS: Eye: 4; Verbal: 5 Motor: 6 Total: 15       Right Pupil: round;  reactive         Left Pupil:  round;  reactive      R Motor Strength L Motor Strength    R : 5/5  R dorsiflex: 5/5  R plantarflex: 5/5 L : 5/5  L dorsiflex: 5/5  L plantarflex: 5/5        Sensory:  No sensory deficit  Exposure:       Completed: Yes      Secondary Survey:  Physical Exam  Constitutional:       Comments: Belligerent and combative   HENT:      Head:      Comments: 3 to 4 cm laceration above the left brow     Mouth/Throat:      Mouth: Mucous membranes are moist    Eyes:      Pupils: Pupils are equal, round, and reactive to light  Cardiovascular:      Rate and Rhythm: Regular rhythm  Tachycardia present  Pulmonary:      Effort: Pulmonary effort is normal    Abdominal:      General: Abdomen is flat  There is no distension  Palpations: Abdomen is soft  Tenderness: There is no abdominal tenderness     Musculoskeletal:         General: Normal range of motion  Cervical back: Normal range of motion  Skin:     General: Skin is warm  Capillary Refill: Capillary refill takes less than 2 seconds  Neurological:      General: No focal deficit present  Mental Status: She is alert and oriented to person, place, and time  Mental status is at baseline  Motor: No weakness  Invasive Devices     None               Lab Results:   Results: I have personally reviewed all pertinent laboratory/tests results, BMP/CMP:   Lab Results   Component Value Date    SODIUM 143 02/25/2023    K 4 1 02/25/2023     (H) 02/25/2023    CO2 29 02/25/2023    CO2 25 02/25/2023    BUN 12 02/25/2023    CREATININE 0 85 02/25/2023    GLUCOSE 114 02/25/2023    CALCIUM 9 0 02/25/2023    AST 26 02/25/2023    ALT 22 02/25/2023    ALKPHOS 88 02/25/2023    EGFR 74 02/25/2023   , CBC:   Lab Results   Component Value Date    WBC 10 14 02/25/2023    HGB 15 6 (H) 02/25/2023    HCT 46 02/25/2023    MCV 94 02/25/2023     02/25/2023    MCH 30 3 02/25/2023    MCHC 32 4 02/25/2023    RDW 13 9 02/25/2023    MPV 10 0 02/25/2023    NRBC 0 02/25/2023   , Coagulation: No results found for: PT, INR, APTT and Lactate: No results found for: LACTATE    Imaging Results: I have personally reviewed pertinent reports  TRAUMA - CT head wo contrast    Result Date: 2/25/2023  Impression: No intracranial hemorrhage or calvarial fracture  Above findings discussed with Dr Atul Leon at [address] PM on 2/25/2023  Workstation performed: [id number]     TRAUMA - CT spine cervical wo contrast    Result Date: 2/25/2023  Impression: No cervical spine fracture or traumatic malalignment  Above findings discussed with Dr Atul Leon at [address] PM on 2/25/2023  Workstation performed: [id number]     TRAUMA - CT chest abdomen pelvis w contrast    Result Date: 2/25/2023  Impression: No evidence of acute traumatic injury within the thorax, abdomen, or pelvis  No displaced fracture identified  Diffuse hepatic steatosis  Left upper pole cortical atrophy/scarring with diminished parenchymal enhancement, nonspecific  Correlate clinically for infectious etiology i e  pyelonephritis  Above findings discussed with Dr Carlotta Forrester at 10:05 PM on 2/25/2023  Workstation performed: [id number]       Code Status: No Order  Advance Directive and Living Will:      Power of :    POLST:    I have spent 30 minutes with Patient  today in which greater than 50% of this time was spent in counseling/coordination of care regarding Diagnostic results and Instructions for management

## 2023-02-26 NOTE — CASE MANAGEMENT
Case Management Assessment & Discharge Planning Note    Patient name Giorgio Agarwal  Location ICU /ICU 01 MRN [id number]  : 1965 Date 2023       Current Admission Date: 2023  Current Admission Diagnosis:Fall   Patient Active Problem List    Diagnosis Date Noted   • Acute respiratory failure with hypoxia ([facility] [address] ) 2023   • Laceration of left eyebrow 2023   • Lactic acid acidosis 2023   • Alcohol intoxication ([facility] [address] ) 2023   • Fall 2023   • Kidney atrophy 2023      LOS (days): 1  Geometric Mean LOS (GMLOS) (days):   Days to GMLOS:     OBJECTIVE:    Risk of Unplanned Readmission Score: 9 49         Current admission status: Inpatient       Preferred Pharmacy:   [facility] TO E-PRESCRIBE  No address on file      Primary Care Provider: Colin Velazco MD    Primary Insurance:   Secondary Insurance:     ASSESSMENT:  [place], [facility] Representative - Daughter   Primary Phone: [phone] (Mobile)               Patient Information  Admitted from[de-identified] Home  Mental Status: Alert  During Assessment patient was accompanied by: Not accompanied during assessment  Assessment information provided by[de-identified] Patient  Primary Caregiver: Self  Support Systems: Self, Spouse/significant other, Family members  What city do you live in?: [address] entry access options   Select all that apply : Stairs  Number of steps to enter home : 3  Type of Current Residence: [address] [place] home  Upon entering residence, is there a bedroom on the main floor (no further steps)?: No  A bedroom is located on the following floor levels of residence (select all that apply):: 2nd Floor  Upon entering residence, is there a bathroom on the main floor (no further steps)?: Yes  Number of steps to 2nd floor from main floor: One Flight  In the last 12 months, was there a time when you were not able to pay the mortgage or rent on time?: No  In the last 12 months, how many places have you lived?: 1  In the last 12 months, was there a time when you did not have a steady place to sleep or slept in a shelter (including now)?: No  Homeless/housing insecurity resource given?: N/A  Living Arrangements: Lives w/ Spouse/significant other  Is patient a ?: No    Activities of Daily Living Prior to Admission  Functional Status: Independent  Completes ADLs independently?: Yes  Ambulates independently?: Yes  Does patient use assisted devices?: No  Does patient currently own DME?: No  Does patient have a history of Outpatient Therapy (PT/OT)?: Yes  Does the patient have a history of Short-Term Rehab?: No  Does patient have a history of HHC?: No  Does patient currently have [facility]?: No    Patient Information Continued  Income Source: Unemployed  Within the past 12 months, you worried that your food would run out before you got the money to buy more : Never true  Within the past 12 months, the food you bought just didn't last and you didn't have money to get more : Never true  Food insecurity resource given?: N/A  Does patient receive dialysis treatments?: No  Does patient have a history of substance abuse?: Yes  Historical substance use preference: Alcohol/ETOH  History of Withdrawal Symptoms: Denies past symptoms  Is patient currently in treatment for substance abuse?: No  Patient declined treatment information    Does patient have a history of Mental Health Diagnosis?: No    Means of Transportation  Means of Transport to Appts[de-identified] Drives Self  In the past 12 months, has lack of transportation kept you from medical appointments or from getting medications?: No  In the past 12 months, has lack of transportation kept you from meetings, work, or from getting things needed for daily living?: No  Was application for public transport provided?: N/A        DISCHARGE DETAILS:    Discharge planning discussed with[de-identified] Patient  Freedom of Choice: Yes  Comments - Freedom of Choice: Discussed FOC  CM contacted family/caregiver?: No- see comments (Declined)     CM reviewed d/c planning process including the following: identifying help at home, patient preference for d/c planning needs, Discharge Lounge, [facility] Meds to Bed program, availability of treatment team to discuss questions or concerns patient and/or family may have regarding understanding medications and recognizing signs and symptoms once discharged  CM also encouraged patient to follow up with all recommended appointments after discharge  Patient advised of importance for patient and family to participate in managing patient’s medical well being  Information obtained from patient, lives in 2 story home with S/O   3 matilde, 1/2 bath on first floor, full bathroom and bedroom on 2nd floor, IADLS, drives self, states she is in between jobs at this time  Vaccinated x2 for covid  Patient does have a history of ETOH, but declines any treatment options  Patient does not have medical insurance   Referral for PATHS

## 2023-02-27 NOTE — UTILIZATION REVIEW
Initial Clinical Review    Admission: Date/Time/Statement:   Admission Orders (From admission, onward)     Ordered        02/25/23 2127  Inpatient Admission  Once                      Orders Placed This Encounter   Procedures   • Inpatient Admission     Standing Status:   Standing     Number of Occurrences:   1     Order Specific Question:   Level of Care     Answer:   Critical Care [15]     Order Specific Question:   Bed Type     Answer:   Trauma [7]     Order Specific Question:   Estimated length of stay     Answer:   More than 2 Midnights     Order Specific Question:   Certification     Answer:   I certify that inpatient services are medically necessary for this patient for a duration of greater than two midnights  See H&P and MD Progress Notes for additional information about the patient's course of treatment  ED Arrival Information     Expected   -    Arrival   2/25/2023 20:23    Acuity   Emergent            Means of arrival   Ambulance    Escorted by   [facility]    Service   Trauma    Admission type   Emergency            Arrival complaint   -           Chief Complaint   Patient presents with   • Fall     Per ems - patient fell, + headstrike, unknown LOC, - thinners  + etoh, combative and uncooperative at scene  Eyebrow laceration       Initial Presentation: 59 y o  female who presented as Level B trauma alert  by EMS to [address] [place] [address] ED  Inpatient admission for evaluation and treatment of alcohol intoxication and fall  PMHx: AUD  Presented w/ fall onto face in setting of acute intoxication  On exam, small laceration over L brow, GCS 15, tachycardic  While in CT, became acute hypoxic w/ SpO2 in 50s and heavy secretion, pt intubated for airway protection  Plan: CIWA monitoring, wean vent as tolerated, maintain c-collar, aparicio, I&O, NPO, NGT, propofol and precedex gtt, IVF, maintain vent/OGT/aparicio  Date: 2/26/22   Day 2: Imaging negative for acute injuries   Woke up and followed commands off sedation around 0330 but was intermittently apneic on PS but was also intermittently agitated, shaking and striking the bed etc despite restraints  Placed back on sedation with precedex  Exam: 2 cm lac above L eyebrow, ventilator  Plan: NPO, wean vent as able, I&O, IVF, Trend labs, replete electrolytes as needed  CIWA monitoring, thiamine/folic acid       ED Triage Vitals   Temperature Pulse Respirations Blood Pressure SpO2   02/25/23 2032 02/25/23 2032 02/25/23 2032 02/25/23 2032 02/25/23 2032   98 5 °F (36 9 °C) 104 20 (!) 180/97 99 %      Temp Source Heart Rate Source Patient Position - Orthostatic VS BP Location FiO2 (%)   02/25/23 2032 02/25/23 2032 -- 02/26/23 0800 --   Tympanic Monitor  Left arm       Pain Score       02/26/23 0900       No Pain          Wt Readings from Last 1 Encounters:   02/25/23 68 2 kg (150 lb 5 7 oz)     Additional Vital Signs:   Date/Time Temp Pulse Resp BP MAP (mmHg) SpO2 O2 Device   02/26/23 1100 -- 96 18 119/70 84 99 % --   02/26/23 1000 -- 86 17 125/66 82 98 % --   02/26/23 0930 -- 86 19 125/62 88 100 % --   02/26/23 0900 -- 84 29 Abnormal  111/54 76 99 % --   02/26/23 0830 -- 86 16 97/65 76 99 % --   02/26/23 0800 98 2 °F (36 8 °C) 70 15 86/54 Abnormal  64 Abnormal  99 % --   02/26/23 0726 -- -- -- -- -- 99 % --   02/26/23 0700 -- 66 15 98/59 68 99 % --   02/26/23 0624 -- 68 16 105/66 78 100 % --   02/26/23 0554 -- 68 16 110/70 82 100 % --   02/26/23 0524 -- 66 16 114/69 83 100 % --   02/26/23 0500 -- 64 16 118/70 85 100 % --   02/26/23 0424 -- 62 15 120/76 90 100 % --   02/26/23 0404 -- 66 16 112/58 81 100 % --   02/26/23 0400 -- 68 18 113/71 84 100 % --   02/26/23 0333 -- 78 21 92/64 73 99 % --   02/26/23 0311 -- -- -- -- -- 99 % --   02/26/23 0301 -- 70 23 Abnormal  81/56 Abnormal  67 100 % --   02/26/23 0257 -- 66 26 Abnormal  81/55 Abnormal  68 99 % --   02/26/23 0202 -- 64 16 73/53 Abnormal  58 Abnormal  99 % --   02/26/23 0159 -- 64 16 75/55 Abnormal  60 Abnormal  99 % --   02/26/23 0158 -- 64 16 75/55 Abnormal  60 Abnormal  99 % --   02/26/23 0154 -- 64 16 76/55 Abnormal  61 Abnormal  99 % --   02/26/23 0100 98 5 °F (36 9 °C) 64 16 91/63 71 100 % --   02/26/23 0005 -- -- -- -- -- 100 % --   02/26/23 0000 -- 70 29 Abnormal  105/63 78 100 % --   02/25/23 2330 -- 60 16 60/40 Abnormal  48 Abnormal  98 % --   02/25/23 2300 -- 62 15 50/36 Abnormal  41 Abnormal  97 % --   02/25/23 2215 97 5 °F (36 4 °C) 70 15 -- -- 97 % --   02/25/23 2145 -- -- -- -- -- 99 % --   02/25/23 2100 -- 99 14 140/67 97 99 % --   02/25/23 2040 -- 121 Abnormal  14 144/105 Abnormal  -- 99 % Nasal cannula   02/25/23 2036 -- 119 Abnormal  20 177/99 Abnormal  -- 98 % Nasal cannula     [place] Coma Scale  Date and Time Eye Opening Best Verbal Response Best Motor Response Jen Coma Scale Score   02/26/23 1215 4 5 6 15   02/26/23 0900 4 5 6 15   02/26/23 0800 3 1 6 10   02/26/23 0600 1 1 6 8   02/26/23 0400 1 1 6 8   02/26/23 0200 1 1 5 7   02/26/23 0000 1 1 5 7   02/25/23 2300 1 1 5 7   02/25/23 2215 1 1 5 7   02/25/23 2100 1 1 1 3   02/25/23 2040 4 4 6 14   02/25/23 2036 4 4 6 14   02/25/23 2032 4 4 6 14     Pertinent Labs/Diagnostic Test Results:   TRAUMA - CT chest abdomen pelvis w contrast   Final Result by Eugenio Prasad MD (02/25 2207)      No evidence of acute traumatic injury within the thorax, abdomen, or pelvis  No displaced fracture identified  Diffuse hepatic steatosis  Left upper pole cortical atrophy/scarring with diminished parenchymal enhancement, nonspecific  Correlate clinically for infectious etiology i e  pyelonephritis  Above findings discussed with Dr Fox Banda at 10:05 PM on 2/25/2023  Workstation performed: [id number]         TRAUMA - CT head wo contrast   Final Result by Eugenio Prasad MD (02/25 2119)      No intracranial hemorrhage or calvarial fracture  Above findings discussed with Dr Fox Banda at [address] PM on 2/25/2023  Workstation performed: [id number]         TRAUMA - CT spine cervical wo contrast   Final Result by Tino Dexter MD (02/25 2124)      No cervical spine fracture or traumatic malalignment  Above findings discussed with Dr Theron Vora at [address] PM on 2/25/2023               Workstation performed: [id number]               Results from last 7 days   Lab Units 02/26/23 0447 02/25/23 2039 02/25/23 2038   WBC Thousand/uL 8 85  --  10 14   HEMOGLOBIN g/dL 12 9  --  14 6   I STAT HEMOGLOBIN g/dl  --  15 6*  --    HEMATOCRIT % 39 0  --  45 1   HEMATOCRIT, ISTAT %  --  46  --    PLATELETS Thousands/uL 259  --  330   NEUTROS ABS Thousands/µL 4 78  --  4 95         Results from last 7 days   Lab Units 02/26/23 0447 02/25/23 2039 02/25/23 2038   SODIUM mmol/L 142  --  143   POTASSIUM mmol/L 3 8  --  4 1   CHLORIDE mmol/L 110*  --  109*   CO2 mmol/L 23  --  25   CO2, I-STAT mmol/L  --  29  --    ANION GAP mmol/L 9  --  9   BUN mg/dL 9  --  12   CREATININE mg/dL 0 76  --  0 85   EGFR ml/min/1 73sq m 85  --  74   CALCIUM mg/dL 7 7*  --  9 0   CALCIUM, IONIZED mmol/L  --   --  1 14   CALCIUM, IONIZED, ISTAT mmol/L  --  1 08*  --    MAGNESIUM mg/dL 2 2  --  2 1   PHOSPHORUS mg/dL 2 5  --  4 0     Results from last 7 days   Lab Units 02/26/23 0447 02/25/23 2038   AST U/L 36 26   ALT U/L 18 22   ALK PHOS U/L 68 88   TOTAL PROTEIN g/dL 5 9* 7 1   ALBUMIN g/dL 2 9* 3 8   TOTAL BILIRUBIN mg/dL 0 36 0 16*         Results from last 7 days   Lab Units 02/26/23 0447 02/25/23 2038   GLUCOSE RANDOM mg/dL 132 110     Results from last 7 days   Lab Units 02/25/23 2039   PH, TOMMY I-STAT  7 359   PCO2, TOMMY ISTAT mm HG 48 6   PO2, TOMMY ISTAT mm HG 99 0*   HCO3, TOMMY ISTAT mmol/L 27 4   I STAT BASE EXC mmol/L 1   I STAT O2 SAT % 97*     Results from last 7 days   Lab Units 02/25/23  2038   LACTIC ACID mmol/L 2 9*     Results from last 7 days   Lab Units 02/26/23  0447 02/25/23  2220   CLARITY UA  Clear Clear   COLOR UA  Light Yellow Colorless SPEC GRAV UA  1 050* 1 046*   PH UA  6 5 5 5   GLUCOSE UA mg/dl Negative Negative   KETONES UA mg/dl 10 (1+)* Negative   BLOOD UA  Large* Small*   PROTEIN UA mg/dl Trace* 30 (1+)*   NITRITE UA  Negative Negative   BILIRUBIN UA  Negative Negative   UROBILINOGEN UA (BE) mg/dl <2 0 <2 0   LEUKOCYTES UA  Trace* Negative   WBC UA /hpf Innumerable* 1-2   RBC UA /hpf 4-10* 10-20*   BACTERIA UA /hpf Occasional None Seen   EPITHELIAL CELLS WET PREP /hpf Occasional Occasional     Results from last 7 days   Lab Units 02/25/23 2220   AMPH/METH  Negative   BARBITURATE UR  Negative   BENZODIAZEPINE UR  Negative   COCAINE UR  Negative   METHADONE URINE  Negative   OPIATE UR  Negative   PCP UR  Negative   THC UR  Negative         ED Treatment:   Medication Administration from 02/25/2023 2020 to 02/25/2023 2210       Date/Time Order Dose Route Action     02/25/2023 2028 EST ketamine (KETALAR) 200 mg Intramuscular Given     02/25/2023 2046 EST iohexol (OMNIPAQUE) 350 MG/ML injection (SINGLE-DOSE) 100 mL 100 mL Intravenous Given     02/25/2023 2130 EST fentaNYL 1000 mcg in sodium chloride 0 9% 100mL infusion 25 mcg/hr Intravenous New Bag     02/25/2023 2130 EST propofol (DIPRIVAN) 1000 mg in 100 mL infusion (premix) 20 mcg/kg/min Intravenous New Bag     02/25/2023 2134 EST propofol (DIPRIVAN) 200 MG/20ML bolus injection 100 mg 100 mg Intravenous Given by Other        Present on Admission:  • Acute respiratory failure with hypoxia (HCC)  • Lactic acid acidosis  • Alcohol intoxication (HCC)  • Fall      Admitting Diagnosis: Delirium [R41 0]  Acute alcohol intoxication ([facility] [address] ) [F10 929]  Closed head injury, initial encounter [B96 31BF]  Alcoholic intoxication without complication ([facility] [address] ) [F66 753]  Acute hypoxemic respiratory failure ([facility] [address] ) [J96 01]  Unspecified multiple injuries, initial encounter [T07  XXXA]  Age/Sex: 59 y o  female  Admission Orders:  NPO  --- Regular Diet  Cardio-Pulm monitoring    Mechanical [place] DUTTA  I&O  SCDs  Neuro checks      Medications:   Date/Time Order Dose Route Action    02/26/2023 0017 EST tetanus-diphtheria-acellular pertussis (BOOSTRIX) IM injection 0 5 mL 0 5 mL Intramuscular Given    02/26/2023 0846 EST dexmedeTOMIDine (Precedex) 400 mcg in sodium chloride 0 9% 100 mL 0 mcg/kg/hr Intravenous Stopped    02/26/2023 0805 EST dexmedeTOMIDine (Precedex) 400 mcg in sodium chloride 0 9% 100 mL 0 2 mcg/kg/hr Intravenous Rate/Dose Change    02/26/2023 0745 EST dexmedeTOMIDine (Precedex) 400 mcg in sodium chloride 0 9% 100 mL 0 4 mcg/kg/hr Intravenous Rate/Dose Change    02/26/2023 0730 EST dexmedeTOMIDine (Precedex) 400 mcg in sodium chloride 0 9% 100 mL 0 6 mcg/kg/hr Intravenous Rate/Dose Change    02/26/2023 0712 EST dexmedeTOMIDine (Precedex) 400 mcg in sodium chloride 0 9% 100 mL 0 8 mcg/kg/hr Intravenous Rate/Dose Change    02/26/2023 0624 EST dexmedeTOMIDine (Precedex) 400 mcg in sodium chloride 0 9% 100 mL 0 6 mcg/kg/hr Intravenous Rate/Dose Change    02/26/2023 0555 EST dexmedeTOMIDine (Precedex) 400 mcg in sodium chloride 0 9% 100 mL 0 8 mcg/kg/hr Intravenous Rate/Dose Change    02/26/2023 0500 EST dexmedeTOMIDine (Precedex) 400 mcg in sodium chloride 0 9% 100 mL 1 mcg/kg/hr Intravenous Rate/Dose Change    02/26/2023 0430 EST dexmedeTOMIDine (Precedex) 400 mcg in sodium chloride 0 9% 100 mL 1 2 mcg/kg/hr Intravenous Rate/Dose Change    02/26/2023 0358 EST dexmedeTOMIDine (Precedex) 400 mcg in sodium chloride 0 9% 100 mL 1 5 mcg/kg/hr Intravenous New Bag    02/26/2023 0212 EST dexmedeTOMIDine (Precedex) 400 mcg in sodium chloride 0 9% 100 mL 0 5 mcg/kg/hr Intravenous Rate/Dose Change    02/26/2023 0204 EST dexmedeTOMIDine (Precedex) 400 mcg in sodium chloride 0 9% 100 mL 0 8 mcg/kg/hr Intravenous Rate/Dose Change    02/26/2023 0120 EST dexmedeTOMIDine (Precedex) 400 mcg in sodium chloride 0 9% 100 mL 1 mcg/kg/hr Intravenous Rate/Dose Change    02/26/2023 0018 EST dexmedeTOMIDine (Precedex) 400 mcg in sodium chloride 0 9% 100 mL 1 2 mcg/kg/hr Intravenous Rate/Dose Change    02/26/2023 0011 EST dexmedeTOMIDine (Precedex) 400 mcg in sodium chloride 0 9% 100 mL 1 2 mcg/kg/hr Intravenous Rate/Dose Change    02/25/2023 2211 EST dexmedeTOMIDine (Precedex) 400 mcg in sodium chloride 0 9% 100 mL 0 5 mcg/kg/hr Intravenous New Bag    02/26/2023 0955 EST multi-electrolyte (PLASMALYTE-A/ISOLYTE-S PH 7 4) IV solution 100 mL/hr Intravenous New Bag    02/25/2023 2348 EST multi-electrolyte (PLASMALYTE-A/ISOLYTE-S PH 7 4) IV solution 100 mL/hr Intravenous New Bag    02/26/2023 0347 EST fentanyl citrate (PF) 100 MCG/2ML 50 mcg 50 mcg Intravenous Given    08/44/0192 1263 EST folic acid 1 mg, thiamine (VITAMIN B1) 100 mg in sodium chloride 0 9 % 100 mL IV piggyback -- Intravenous New Bag    02/25/2023 2341 EST bacitracin topical ointment 1 small application 1 small application Topical Given    02/26/2023 0320 EST propofol (DIPRIVAN) 1000 mg in 100 mL infusion (premix) 0 mcg/kg/min Intravenous Stopped    02/25/2023 2315 EST propofol (DIPRIVAN) 1000 mg in 100 mL infusion (premix) 40 mcg/kg/min Intravenous Rate/Dose Change    02/26/2023 0805 EST pantoprazole (PROTONIX) injection 40 mg 40 mg Intravenous Given    02/25/2023 2345 EST pantoprazole (PROTONIX) injection 40 mg 40 mg Intravenous Given    02/26/2023 0819 EST chlorhexidine (PERIDEX) 0 12 % oral rinse 15 mL 15 mL Mouth/Throat Given    02/25/2023 2345 EST chlorhexidine (PERIDEX) 0 12 % oral rinse 15 mL 15 mL Mouth/Throat Given    02/26/2023 0805 EST enoxaparin (LOVENOX) subcutaneous injection 30 mg 30 mg Subcutaneous Given    02/25/2023 2357 EST enoxaparin (LOVENOX) subcutaneous injection 30 mg 30 mg Subcutaneous Given    02/26/2023 0617 EST norepinephrine (LEVOPHED) 4 mg (STANDARD CONCENTRATION) IV in sodium chloride 0 9% 250 mL 1 mcg/min Intravenous Rate/Dose Change    02/26/2023 0320 EST norepinephrine (LEVOPHED) 4 mg (STANDARD CONCENTRATION) IV in sodium chloride 0 9% 250 mL 2 mcg/min Intravenous New Bag    02/26/2023 0008 EST norepinephrine (LEVOPHED) 4 mg (STANDARD CONCENTRATION) IV in sodium chloride 0 9% 250 mL 1 mcg/min Intravenous Not Given    02/26/2023 0007 EST norepinephrine (LEVOPHED) 1 mg/mL injection **ADS Override Pull** --  Not Given    02/25/2023 2348 EST multi-electrolyte (ISOLYTE-S PH 7 4) bolus 1,000 mL 1,000 mL Intravenous New Bag    02/26/2023 0003 EST PHENobarbital injection 130 mg 130 mg Intravenous Given    02/26/2023 0211 EST multi-electrolyte (ISOLYTE-S PH 7 4) bolus 1,000 mL 1,000 mL Intravenous New Bag    02/26/2023 0403 EST fentanyl citrate (PF) 100 MCG/2ML 50 mcg 50 mcg Intravenous Given    02/26/2023 0805 EST potassium phosphates 30 mmol in sodium chloride 0 9 % 250 mL infusion 30 mmol Intravenous New Bag         IP CONSULT TO CASE MANAGEMENT    Network Utilization Review Department  ATTENTION: Please call with any questions or concerns to [phone] and carefully listen to the prompts so that you are directed to the right person  All voicemails are confidential   Jayant Irizarry all requests for admission clinical reviews, approved or denied determinations and any other requests to dedicated fax number below belonging to the campus where the patient is receiving treatment   List of dedicated fax numbers for the Facilities:  [address] DENIALS (Administrative/Medical Necessity) [phone]   [address] (Maternity/NICU/Pediatrics) [phone]   [address] [phone]   [facility] [place] [address] [phone]   [facility] [place] [address] [address] [place] [place] [address] [address]   [address] [facility] [address] [place] [address] [facility] [phone]

## 2023-04-26 PROBLEM — S01.112A LACERATION OF LEFT EYEBROW: Status: RESOLVED | Noted: 2023-02-25 | Resolved: 2023-04-26

## 2023-09-12 ENCOUNTER — LAB REQUISITION (OUTPATIENT)
Dept: LAB | Facility: HOSPITAL | Age: 58
End: 2023-09-12

## 2023-09-12 DIAGNOSIS — Z02.83 ENCOUNTER FOR BLOOD-ALCOHOL AND BLOOD-DRUG TEST: ICD-10-CM
